# Patient Record
(demographics unavailable — no encounter records)

---

## 2024-11-12 NOTE — PHYSICAL EXAM
[Well Developed] : well developed [Well Nourished] : well nourished [No Acute Distress] : no acute distress [No Carotid Bruit] : no carotid bruit [Normal S1, S2] : normal S1, S2 [No Murmur] : no murmur [Clear Lung Fields] : clear lung fields [Good Air Entry] : good air entry [No Respiratory Distress] : no respiratory distress  [Edema ___] : edema [unfilled] [Venous varicosities] : venous varicosities [Moves all extremities] : moves all extremities [No Focal Deficits] : no focal deficits [Normal Speech] : normal speech [Alert and Oriented] : alert and oriented [Normal memory] : normal memory

## 2024-11-12 NOTE — REVIEW OF SYSTEMS
[Chest Discomfort] : chest discomfort [Lower Ext Edema] : lower extremity edema [Constipation] : constipation [Depression] : depression [Negative] : Heme/Lymph

## 2024-11-13 NOTE — HISTORY OF PRESENT ILLNESS
[FreeTextEntry1] : The patient's home blood pressures are okay.  He walks a little but climbs 5 flights of stairs.  He has had a left chest discomfort which is dull and rare.  He saw the gastroenterologist 4 months ago.  He is not on a low-salt diet.

## 2024-11-13 NOTE — DISCUSSION/SUMMARY
[FreeTextEntry1] : The patient is physically active without difficulty.  His chest pain is noncardiac.  The EKG shows low voltage.  He has edema.  He will return for an echocardiogram.  He will elevate his legs and reduce the salt in his diet.He will continue amlodipine.  He has a history of venous reflux.  He will return for venous Doppler. [EKG obtained to assist in diagnosis and management of assessed problem(s)] : EKG obtained to assist in diagnosis and management of assessed problem(s)

## 2024-12-26 NOTE — PHYSICAL EXAM
[General Appearance - Alert] : alert [General Appearance - In No Acute Distress] : in no acute distress [Neck Appearance] : the appearance of the neck was normal [Neck Cervical Mass (___cm)] : no neck mass was observed [Jugular Venous Distention Increased] : there was no jugular-venous distention [Thyroid Diffuse Enlargement] : the thyroid was not enlarged [Thyroid Nodule] : there were no palpable thyroid nodules [Auscultation Breath Sounds / Voice Sounds] : lungs were clear to auscultation bilaterally [Heart Rate And Rhythm] : heart rate was normal and rhythm regular [Heart Sounds] : normal S1 and S2 [Heart Sounds Gallop] : no gallops [Murmurs] : no murmurs [Heart Sounds Pericardial Friction Rub] : no pericardial rub [Bowel Sounds] : normal bowel sounds [Abdomen Soft] : soft [Abdomen Tenderness] : non-tender [] : no hepato-splenomegaly [Abdomen Mass (___ Cm)] : no abdominal mass palpated [Cervical Lymph Nodes Enlarged Posterior Bilaterally] : posterior cervical [Cervical Lymph Nodes Enlarged Anterior Bilaterally] : anterior cervical [Supraclavicular Lymph Nodes Enlarged Bilaterally] : supraclavicular [FreeTextEntry1] : no tenderness on right side

## 2024-12-26 NOTE — ASSESSMENT
[FreeTextEntry1] : -- I have advised them that I am transitioning away from being a PCP and they will find a new PCP. I will remain their PCP until they transition to a new PCP. I will remain their nephrologist. Information given and referral made.  # HTN controlled. White coat effect. Home machine was confirmed accurate today.  * Continue torsemide 2.5 mg three times a week. (This is the dose he has been taking.) * Cont torsemide, losartan, amlodipine. * A counseling information sheet on blood pressure and staying healthy has been given (which they have been instructed to read). * The patient has been counseled to check their BP at home with an automatic arm cuff, write down the readings, and reach me directly on the phone immediately if they are persistently > 180 systolic or if SBP is less than 100 or if lightheadedness develops. They were counseled to bring in all blood pressure readings and medications next visit. * The patient has been counseled that regular office follow-up (at least every 2 months for now) is important for monitoring and for their health, and that it is their responsibility to make follow up appointments. * The patient also has been counseled that they must never stop or change any medications without discussing this with me (or another physician).  # CKD stage 3 due to aging/HTN with 124 mg albuminuria. * Recheck labs next visit.  * Not on SGLT2i due to rash. * Therapies for kidney disease: blood pressure control; proteinuria reduction with ARB/ACEi; other evidence-based therapies recommended including exercise, a plant-based lower oxalate diet, and 400 mcg folic acid daily * Cardiovascular disease prevention: counseling on healthy diet, physical activity, weight loss, alcohol limitation, blood pressure control; statin therapy; cardiology evaluation/followup advised * A counseling information sheet on CKD has been given (which they have been instructed to read). * The patient has been counseled that chronic kidney disease is a significant condition and regular office follow-up with me (at least every 2 months for now) is important for monitoring and their health, and that it is their responsibility to make a follow-up appointment. * The patient has been counseled never to stop taking their medications without discussing it with me or another doctor. * The patient has been counseled on avoiding NSAIDs. * The patient has been counseled on risk of worsening kidney function and instructed to immediately call and speak with me and go immediately to ER with any severe symptoms, nausea, vomiting, diarrhea, chest pain, or shortness of breath.  # History of renal cell cancer. * Follow up with radiology/urology.  # Hypokalemia. * Improved on   # Urolithiasis. * High fluid intake.   # Monoclonal protein. * Follow up with heme.

## 2024-12-26 NOTE — HISTORY OF PRESENT ILLNESS
[FreeTextEntry1] : HTN borderline uncontrolled.  - 130s at home. White coat effect. No SOB with exertion. No CP. No lightheadedness (pre-syncope). Adherent to medications. * K normalized (4.2). CKD stable, creatinine 1.86, eGFR 35 by CKD-EPIcr. * He has seen hematologist for monocloanl protein (IgG). * No renal colic.   Previous history (13Sep24): * K 3.4. No history of hyperkalemia but he has been on strict low K diet. * eGFR 35 (avg. of CKD-EPIcr & CKD-EPIcys).   Previous history (09Sep24):  * Renal lesion being followed by urology and IR. * No abdominal discomfort.  * HTN uncontrolled. BP 110s - 120s at home. No SOB with exertion. No CP. No lightheadedness. Compliant with medications.* He has been taking torsemide 2.5 mg daily and wishes to stay on this dose.   Previous history (16Jul24):  * Renal lesion being followed by urology and IR. * Right sided discomfort on 4/8 and CT scan showed possible passed kidney stone on right that was in the bladder. Following up with urologist. No further colic. * HTN borderline uncontrolled. BP 120s - 130s at home. No SOB with exertion. No CP. No lightheadedness. Compliant with medications. Swelling was intolerable with amlodipine 10, which was reduced to 5. * No diuresis to torsemide 2.5.   Previous history (20May24): eGFR 38 (avg. of CKD-EPIcr & CKD-EPIcys). 207 mg albuminuria. * Renal lesion being followed by urology and IR. * BP controlled. BP 120s at home. No SOB with exertion. No CP. No lightheadedness. Compliant with medications. * Right sided discomfort on 4/8 and CT scan showed possible passed kidney stone on right that was in the bladder. Following up with urologist.   Previous history (09Mar24): BP controlled. BP 120s on average at home. White coat effect in office.No SOB with exertion. No CP. No lightheadedness. Compliant with medications.  * 400 mg proteinuria. eGFR 38 (avg. of CKD-EPIcr & CKD-EPIcys). * Right sided discomfort on 4/8 and CT scan showed possible passed kidney stone on right that was in the bladder. Following up with urologist.   Previous history (11Apr24): Following up with urologist. for stone. * Cr 1.77. * No flank pain now c/w kidney stone. * HTN controlled.   Previous history (09Apr24): Labs from 4/8/24 reviewed: creatinine1.77. BP controlled.  No SOB with exertion. No CP. No lightheadedness. Compliant with medications. * Right sided discomfort on 4/8 and CT scan showed possible passed kidney stone on right that was in the bladder.   Previous history (06Mar24): * CKD stable, creatinine 1.86. Feels well. * He is taking torsemide 0.5 3x a week. * S/P ablation of left renal mass. Being followed yearly. * BP controlled.  - 120s  at home.   Previous history ([id number]): * BP controlled.  - 120s  at home. White coat effect in office. No lightheadedness. No CP/SOB. Compliant with medications.  * He is taking torsemide 0.5 3x a week. * S/P ablation of left renal mass. Being followed yearly. * 160 mg albuminuria. CKD stable, creatinine 1.86.   Previous history (28Ulu24): Labs from Dr. Ye: Cr 1.86. 160 mg albuminuria. * BP controlled.  - 130s at home. White coat effect. No lightheadedness. No CP/SOB. Compliant with medications. * S/P ablation of left renal mass. Being followed yearly. * No further episodes of low temperatures. Baseline is 95 - 96. No chills. * He is taking torsemide 0.5 5x a week.   Previous history (53Hgi46): eGFR 37 (CKD-EPIcr & CKD-EPIcys avg). Urine albumin 58. * BP controlled.  - 120s at home. No lightheadedness. No CP/SOB. Compliant with medications. * S/P ablation of left renal mass. Being followed yearly. * 5x nocturia. Following up with urology. NO polyuria during the daytime.  * No further episodes of low temperatures. Baseline is 95 - 96. No chills.   Previous history ([id number]): * Went to ER for low temp of unclear cause. No signs of sepsis. Now normalized. * eGFR 34. 58 * HGA1c controlled. * S/P ablation of left renal mass. Being followed yearly. * 5x nocturia. Following up with urology. NO polyuria during the daytime.   Previous history (12May23): eGFR 41 based on cystatin C of 1.56 (EK eGFRcys equation, [facility] 2023). * HTN controlled. BP 110s at home. No lightheadedness. No CP/SOB. Compliant with medications.  * S/P ablation of left renal mass. 1.1 cm renal mass in right kidney being followed.   Previous history (11Apr23): eGFR 39 based on cystatin C of 1.65 (EK eGFRcys equation, [facility] 2023).* HTN controlled. BP 110s at home. No lightheadedness. No CP/SOB. Compliant with medications. * S/P ablation of 2.6 cm left renal mass. He is following up with radiologist fo surveillance.   Previous history (11Jan23): * CKD stable, creatinine 1.83 in October 2022. * HTN controlled. BP 110s at home. No lightheadedness. No CP/SOB. Compliant with medications. * S/P ablation of 2.6 cm left renal mass. He is following up with radiologist fo surveillance.   Previous history (09Nov22): * CKD stable, creatinine 1.83 on 10/22 with Dr. Ye. ** HTN controlled. BP 110s at home. No lightheadedness. No CP/SOB. Compliant with medications. * S/P ablation of 2.6 cm left renal mass. He is following up with radiologist fo surveillance.   Previous history (18Aug22): * CKD stable, eGFR 32.5. * HTN controlled.  - 130s at home. No lightheadedness. No CP/SOB. Compliant with medications. * He is following up with radiologist for renal cell cancer surveillance. (S/P ablation of 2.6 cm left kidney mass).   Previous history (02Aug22): * HTN controlled.  - 120s at home. No lightheadedness. No CP/SOB. Compliant with medications. * CKD stable, creatinine 1.86. * He is following up with radiologist for renal cell cancer surveillance. (S/P ablation of 2.6 cm left kidney mass).  Previous history (01Jun22): * Covid 1 month ago treated with renal-dose Paxlovid. No symptoms currently. * * HTN controlled.  - 120s at home. No lightheadedness. No CP/SOB. Compliant with medications. * CKD stable, creatinine 1.86.  * He is following up with radiologist for renal cell cancer surveillance. (S/P ablation of 2.6 cm left kidney mass).  Previous history (18Mar22): * HTN controlled.  - 120s at home. No lightheadedness. Compliant with medications. * He is following up with radiologist for renal cell cancer surveillance. (S/P ablation of 2.6 cm left kidney mass). * CKD stable, creatinine 2.02.   Previous history (81Ote10): He is following up with radiologist for renal cell cancer surveillance. (S/P ablation of 2.6 cm left kidney mass).  He  * HTN controlled. No lightheadedness.  - 120s at home. Compliant with medications. * Labs from Dr. Ye reviewed. 11/15: Creatinine 2.05.   Previous history (36Ixo64): * CKD stable, creatinine 1.94 on 8/31. *  HTN controlled.  - 120s at home. No lightheadedness. Compliant with medications. * He is following up with radiologist for renal cell cancer surveillance. (S/P ablation of 2.6 cm left kidney mass).  Previous history (19Ycz60): * CKD stable, creatinine 2.18. * * HTN controlled. No lightheadedness. Compliant with medications.  * He is following up with radiologist for renal cell cancer surveillance. (S/P ablation of 2.6 cm left kidney mass).  Previous history (21Hai75): * HTN controlled.  - 120s at home. No lightheadedness. Compliant with medications. * CKD stable.  * He is following up with radiologist for renal cell cancer surveillance. (S/P ablation of 2.6 cm left kidney mass).  Previous history (09Jun21): * HTN uncontrolled.  - 150s at home. No lightheadedness. Compliant with medications. * Stopped farxiga due to arm erythema, which has now resolved. * He is following up with radiologist for renal cell cancer surveillance. (S/P ablation of 2.6 cm left kidney mass). * CKD stable, creatinine 1.91.   Previous history (17May21): * CKD stable, creatinine 1.9. * He is following up with radiologist for renal cell cancer surveillance. (S/P ablation of 2.6 cm left kidney mass). * HTN uncontrolled at home, 120s - 150s. Unable to tolerate clonidine.  * Started farxiga once and developed bilateral arm redness without other pain. This has now resolving and he is considering starting again when it completely resolves.   Previous history (06May21): * CKD stable, creatinine 1.9 (eGFR 32). 109 mg albuminuria. * He is following up with radiologist for renal cell cancer surveillance. (S/P ablation of 2.6 cm left kidney mass). * HTN uncontrolled at home, 120s - 150s. Unable to tolerate clonidine.   Previous history (29Mar21): * CKD stable, creatinine 1.9.  * He is following up with radiologist for renal cell cancer surveillance. (S/P ablation of 2.6 cm left kidney mass). * HTN previously borderline controlled.  with Dr. Huang. No lightheadedness. BP 110s - 130 at home.   Previous history (20Jan21): * Moderna vaccine given yesterday. * CKD stable, creatinine 1.94 (eGFR 31). * He is following up with radiologist for renal cell cancer surveillance. (S/P ablation of 2.6 cm left kidney mass).  * HTN controlled. No lightheadedness.   Previous history (28Dec20): * CKD progressive, creatinine 1.94 (eGFR 31). * He is following up with radiologist for renal cell cancer surveillance. (S/P ablation of 2.6 cm left kidney mass). * 200 - 300 mg proteinuria. * Losartan decreased to 50 and amlodipine increased to 10 by Dr. Huang [id number]. BP 110s/70s. No lightheadedness.  * Diplopia due to NPH improving, S/P  shunt. * Anemia improved.   Previous history (21Oct20): * Diplopia due to NPH improving, S/P  shunt. * CKD progressive, creatinine 1.9 eGFR 34. *  PTH 75, Ca 9.3. * HTN controlled, 110 - 120s/70s. No lightheadedness. * He is following up with radiologist for renal cell cancer surveillance. (S/P ablation of 2.6 cm left kidney mass).  Previous history (17Aug20): * 75 ounces of urine daily.  617 osm urine. Urine decreases when he drinks less. * CKD stable, creatinine 1.52, eGFR 33 by cystatin C. * HTN now controlled, average  / 70s. Still on amlodipine 5 mg qd. Now off dexamethasone.   Previous history (07Aug20): *  shunt placed for NPH. Now has double vision. Started on dexamethasone 5 days ago. * Creatinine increased to 1.86 (eGFR 33) on 10Hlb18. U/A normal. * He is following up with radiologist for renal cell cancer surveillance. (S/P ablation of 2.6 cm left kidney mass). * SBP now 140s. He had previously been on amlodipine 7.5 and is now on amlodipine 5. BP at home 120s. * HE WAKES UP 4 - 6 TIMES A NIGHT AND URINATES 2 LITERS. He is following with Cristian Sarmiento (urologist). PVR 82. SG 1.014. SNa 142. Reportedly he urinates < 4 times daily. Denies increased thirst.   Previous history (26May20): * CKD stable, creatinine previously 1.49. He has not had labs in 5 months. * Diagnosed with PD. Following up with Dr. Nino. * MRI scheduled for renal cell cancer surveillance. *   Previous history (13Apr20): * Average BP at home 120s - 130s. No lightheadedness. Compliant. * CKD stable, creatinine 1.46. * No muscle aching or weakness on statin. * He is following up with radiologist for renal cell cancer surveillance.   Previous history (01Oct19): * 1 - 2 weeks ago he developed URI with slight diarrhea, which is now resolving. * CKD previously stable, creatinine 1.88. * HTN controlled. No lightheadedness. Compliant with medications.   Previous history (17Sep19): * Creatinine increased to 1.88 in [id number] and decreased to 1.66. * HTN borderline controlled. No lightheadedness. Compliant with medications. * He has been evaluated by Dr. Meek for monoclonal gammopathy.   Previous history (16Aug19): * Following up with Dr. Kwan s/p ablation of left renal cell carcinoma. * CKD previous stable, creatinine 1.59 in 6/27/19.. * HTN controlled. No lightheadedness. Compliant with medications. * he has been evaluated by Dr. Meek for monoclonal gammopathy.  Previous history (16May19): * Underwent ablation of left renal cell carcinoma. * CKD stable, creatinine 1.52. * HTN controlled.  - 130s at home.  No lightheadedness. Compliant with medications.    Previous history (03Apr19): * Scheduled for ablation for left renal cell carcinoma (2.6 cm). * CKD stable, creatinine 1.42. * HTN uncontrolled in office, SBP 120s - 130s average at home.  SBP with Dr. Huang.  No lightheadedness. Compliant with medications. Following low salt diet. * He believes amlodipine causing constipation and LE edema and would like to stop this.   Previous history (14Feb19): * Since 2014, his creatinine has ranged from 1.4 - 1.7. Most recently, his creatinine was 1.48 (GFR 44). He is unaware of proteinuria or hematuria. The patient denies exposure to chronic NSAIDs, PPIs, green smoothies, creatine, or herbal supplements. The patient denies a history of kidney stones or UTIs.  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 No evidence of myeloma.    Previous chart reviewed and summarized.   Options for clinical preventative services  Moderna 20Jan21, Booster;  Bivalent 2022  Influenza: September 2020, sep 2022  Pneumonia: given x 2 Shingles: shingrix  TDAP: Colonoscopy: 2018 PSA: by urology Dermatologist: advised to see yearly

## 2025-01-09 NOTE — ASSESSMENT
[FreeTextEntry1] : 87 year old gentleman with PMH of hypothyroidism, chronic anemia, CKD stage III, hx of RCC, HTN, HLD, depression, glaucoma, possible nephrolithiasis, who presents for 1.5 right renal mass  1. 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  2. Nocturia  - voiding dairy (didn't do it again)  - consider MAUREEN as cause  Thank you very much for allowing me to assist in the care of this patient. Please do not hesitate to contact me with any additional questions or concerns.  Sincerely,

## 2025-01-09 NOTE — HISTORY OF PRESENT ILLNESS
[FreeTextEntry1] : Dear Dr. Guzman (nephrologist), Jl Sarmiento (Urologist)  Thank you so much for the referral to help care for your patient.  Chief Complaint: 1.5cm right renal mass (stable) Date of first visit: 7/17/24  Barry P. Dubin is a 87 year old gentleman with PMH of hypothyroidism, chronic anemia, CKD stage III, hx of RCC, HTN, HLD, depression, glaucoma, possible nephrolithiasis, who presents for 1.5cm right renal mass. Brother with Kidney Cancer. the interval imaging May 2024 to August 2024 (stable).  Previously patient of Dr. Kwan, renal US ordered by nephrology for CKD showed 2.6cm left renal mass (2019), s/p image guided microwave ablation by Dr. Brandyn Keating on 4/15/19. 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  Allergies: Farxiga and sertraline  PSA Hx: none   MRI Hx: MRI Abdomen at [facility] on 01/07/2025 -  1.  Indeterminate, internal signal changes of the post ablation cavity left upper lobe pole. Cannot totally exclude a low level of enhancement along the inferior aspect of the lesion. Recommend continued follow-up 2.  Stable 1.5 cm mass upper pole right kidney, indeterminate. Recommend continued follow-up.  MRI August 1st 2024. The renal mass is stable over 3 month interval.  MRI Abdomen at [facility] on 05/02/2024. -  1. Since 4/8/2024, no significant change in appearance of ablation cavity along medial aspect of upper pole of left kidney. No evidence of recurrent cancer at this site.  2. A 1.5 cm mass arising from the upper pole of the right kidney is suspicious for an additional site of papillary renal cell carcinoma.  3. There are again a few hemorrhagic cysts in each kidney in additional multiple simple cysts.  4. Small ascites.  5. No change small right adrenal nodule, probably a lipid poor adenoma.  The patient denies fevers, chills, nausea and or vomiting and no unexplained weight loss.  All pertinent laboratory, films and physician notes were reviewed. Questionnaire results were discussed with patient.

## 2025-01-15 NOTE — ASSESSMENT
[FreeTextEntry1] : 87-year-old gentleman with PMH of hypothyroidism, chronic anemia, CKD stage III, hx of RCC, HTN, HLD, depression, glaucoma, possible nephrolithiasis, who presents for follow up for 1.5 right renal mass  1. 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 - recent MRi Cannot totally exclude a low level of enhancement along the inferior aspect of the post ablation cavity in left upper lobe pole kidney, we will repeat MRI in 3 months   2. Nocturia  - voiding dairy (didn't do it again)  - consider MAUREEN as cause- per patient had MAUREEN ruled out by pulmonologist in past   Thank you very much for allowing me to assist in the care of this patient. Please do not hesitate to contact me with any additional questions or concerns.     Sincerely,     George Wiley D.O. Professor of Urology and Radiology  of Urology at [facility] Director for Prostate Cancer [address] [place] [address], [address] [place] [address] Phone: [phone]

## 2025-01-15 NOTE — ASSESSMENT
[FreeTextEntry1] : 87-year-old gentleman with PMH of hypothyroidism, chronic anemia, CKD stage III, hx of RCC, HTN, HLD, depression, glaucoma, possible nephrolithiasis, who presents for follow up for 1.5 right renal mass  1. 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 - recent MRi Cannot totally exclude a low level of enhancement along the inferior aspect of the post ablation cavity in left upper lobe pole kidney, we will repeat MRI in 3 months   2. Nocturia  - voiding dairy (didn't do it again)  - consider MAUREEN as cause- per patient had MAUREEN ruled out by pulmonologist in past   Thank you very much for allowing me to assist in the care of this patient. Please do not hesitate to contact me with any additional questions or concerns.     Sincerely,     George Wiley D.O. Professor of Urology and Radiology  of Urology at [facility] Director for Prostate Cancer [address] [place] [address], [address] [place] [address] Phone: [phone]

## 2025-01-15 NOTE — ASSESSMENT
[FreeTextEntry1] : 87-year-old gentleman with PMH of hypothyroidism, chronic anemia, CKD stage III, hx of RCC, HTN, HLD, depression, glaucoma, possible nephrolithiasis, who presents for follow up for 1.5 right renal mass  1. 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 - recent MRi Cannot totally exclude a low level of enhancement along the inferior aspect of the post ablation cavity in left upper lobe pole kidney, we will repeat MRI in 3 months   2. Nocturia  - voiding dairy (didn't do it again)  - consider MAUREEN as cause- per patient had MAUREEN ruled out by pulmonologist in past   Thank you very much for allowing me to assist in the care of this patient. Please do not hesitate to contact me with any additional questions or concerns.     Sincerely,     George Wiley D.O. Professor of Urology and Radiology  of Urology at [facility] Director for Prostate Cancer [address] [place] [address], [address] [place] [address] Phone: [phone]

## 2025-01-15 NOTE — HISTORY OF PRESENT ILLNESS
[FreeTextEntry1] : Dear Dr. Guzman (nephrologist),   Thank you so much for the referral to help care for your patient.  Chief Complaint: 1.5cm right renal mass (stable) Date of first visit: 7/17/24  Barry P. Dubin is a 87-year-old gentleman with PMH of hypothyroidism, chronic anemia, CKD stage III, hx of RCC, HTN, HLD, depression, glaucoma, possible nephrolithiasis, who presents for 1.5cm right renal mass. Brother with Kidney Cancer. the interval imaging May 2024 to August 2024 (stable).  Previously patient of Dr. Kwan, renal US ordered by nephrology for CKD showed 2.6cm left renal mass (2019), s/p image guided microwave ablation by Dr. Brandyn Keating on 4/15/19. 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  Allergies: Farxiga and sertraline  1/15/25  IPSS10 QOL  3 VALERIE 13  max flow 5.4 ml/s, ave 3.0 ml/s, VV 20.9cc nondiagnostic, PVR 14cc   PSA Hx: none   MRI Hx: MRI Abdomen at [facility] on 01/07/2025 -  1.  Indeterminate, internal signal changes of the post ablation cavity left upper lobe pole. Cannot totally exclude a low level of enhancement along the inferior aspect of the lesion. Recommend continued follow-up 2.  Stable 1.5 cm mass upper pole right kidney, indeterminate. Recommend continued follow-up.  MRI August 1st 2024. The renal mass is stable over 3 month interval.  MRI Abdomen at [facility] on 05/02/2024. -  1. Since 4/8/2024, no significant change in appearance of ablation cavity along medial aspect of upper pole of left kidney. No evidence of recurrent cancer at this site.  2. A 1.5 cm mass arising from the upper pole of the right kidney is suspicious for an additional site of papillary renal cell carcinoma.  3. There are again a few hemorrhagic cysts in each kidney in additional multiple simple cysts.  4. Small ascites.  5. No change small right adrenal nodule, probably a lipid poor adenoma.  The patient denies fevers, chills, nausea and or vomiting and no unexplained weight loss.  All pertinent laboratory, films and physician notes were reviewed. Questionnaire results were discussed with patient.

## 2025-01-15 NOTE — HISTORY OF PRESENT ILLNESS
[FreeTextEntry1] : Dear Dr. Guzman (nephrologist),   Thank you so much for the referral to help care for your patient.  Chief Complaint: 1.5cm right renal mass (stable) Date of first visit: 7/17/24  Barry P. Dubin is a 87-year-old gentleman with PMH of hypothyroidism, chronic anemia, CKD stage III, hx of RCC, HTN, HLD, depression, glaucoma, possible nephrolithiasis, who presents for 1.5cm right renal mass. Brother with Kidney Cancer. the interval imaging May 2024 to August 2024 (stable).  Previously patient of Dr. Kwan, renal US ordered by nephrology for CKD showed 2.6cm left renal mass (2019), s/p image guided microwave ablation by Dr. Brandyn Keating on 4/15/19. 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  Allergies: Farxiga and sertraline  1/15/25  IPSS10 QOL  3 VALERIE 13  max flow 5.4 ml/s, ave 3.0 ml/s, VV 20.9cc nondiagnostic, PVR 14cc   PSA Hx: none   MRI Hx: MRI Abdomen at [facility] on 01/07/2025 -  1.  Indeterminate, internal signal changes of the post ablation cavity left upper lobe pole. Cannot totally exclude a low level of enhancement along the inferior aspect of the lesion. Recommend continued follow-up 2.  Stable 1.5 cm mass upper pole right kidney, indeterminate. Recommend continued follow-up.  MRI August 1st 2024. The renal mass is stable over 3 month interval.  MRI Abdomen at [facility] on 05/02/2024. -  1. Since 4/8/2024, no significant change in appearance of ablation cavity along medial aspect of upper pole of left kidney. No evidence of recurrent cancer at this site.  2. A 1.5 cm mass arising from the upper pole of the right kidney is suspicious for an additional site of papillary renal cell carcinoma.  3. There are again a few hemorrhagic cysts in each kidney in additional multiple simple cysts.  4. Small ascites.  5. No change small right adrenal nodule, probably a lipid poor adenoma.  The patient denies fevers, chills, nausea and or vomiting and no unexplained weight loss.  All pertinent laboratory, films and physician notes were reviewed. Questionnaire results were discussed with patient.

## 2025-01-15 NOTE — ADDENDUM
[FreeTextEntry1] :   I, Dr. Wiley, personally performed the evaluation and management (E/M) services for this established patient who presents today with (a) new problem(s)/exacerbation of (an) existing condition(s).  That E/M includes conducting the examination, assessing all new/exacerbated conditions, and establishing a new plan of care.  Today, my ACP, Bibiana Manning, ANP-BC, was here to observe my evaluation and management services for this new problem/exacerbated condition to be followed going forward.

## 2025-02-26 NOTE — HISTORY OF PRESENT ILLNESS
[FreeTextEntry1] : * eGFR 35 (avg. of CKD-EPIcr & CKD-EPIcys). 207 mg albuminuria. * He has seen hematologist for monocloanl protein (IgG). * No renal colic. * BP controlled.  - 130s at home. White coat effect in office. No SOB with exertion. No CP. No lightheadedness (pre-syncope). Adherent to medications.   Previous history ([id number]): HTN borderline uncontrolled.  - 130s at home. White coat effect. No SOB with exertion. No CP. No lightheadedness (pre-syncope). Adherent to medications. * K normalized (4.2). CKD stable, creatinine 1.86, eGFR 35 by CKD-EPIcr. * He has seen hematologist for monocloanl protein (IgG). * No renal colic.   Previous history (13Sep24): * K 3.4. No history of hyperkalemia but he has been on strict low K diet. * eGFR 35 (avg. of CKD-EPIcr & CKD-EPIcys).   Previous history (09Sep24):  * Renal lesion being followed by urology and IR. * No abdominal discomfort.  * HTN uncontrolled. BP 110s - 120s at home. No SOB with exertion. No CP. No lightheadedness. Compliant with medications.* He has been taking torsemide 2.5 mg daily and wishes to stay on this dose.   Previous history ([id number]):  * Renal lesion being followed by urology and IR. * Right sided discomfort on 4/8 and CT scan showed possible passed kidney stone on right that was in the bladder. Following up with urologist. No further colic. * HTN borderline uncontrolled. BP 120s - 130s at home. No SOB with exertion. No CP. No lightheadedness. Compliant with medications. Swelling was intolerable with amlodipine 10, which was reduced to 5. * No diuresis to torsemide 2.5.   Previous history (20May24): eGFR 38 (avg. of CKD-EPIcr & CKD-EPIcys). 207 mg albuminuria. * Renal lesion being followed by urology and IR. * BP controlled. BP 120s at home. No SOB with exertion. No CP. No lightheadedness. Compliant with medications. * Right sided discomfort on 4/8 and CT scan showed possible passed kidney stone on right that was in the bladder. Following up with urologist.   Previous history (09Mar24): BP controlled. BP 120s on average at home. White coat effect in office.No SOB with exertion. No CP. No lightheadedness. Compliant with medications.  * 400 mg proteinuria. eGFR 38 (avg. of CKD-EPIcr & CKD-EPIcys). * Right sided discomfort on 4/8 and CT scan showed possible passed kidney stone on right that was in the bladder. Following up with urologist.   Previous history (11Apr24): Following up with urologist. for stone. * Cr 1.77. * No flank pain now c/w kidney stone. * HTN controlled.   Previous history (09Apr24): Labs from 4/8/24 reviewed: creatinine1.77. BP controlled.  No SOB with exertion. No CP. No lightheadedness. Compliant with medications. * Right sided discomfort on 4/8 and CT scan showed possible passed kidney stone on right that was in the bladder.   Previous history (06Mar24): * CKD stable, creatinine 1.86. Feels well. * He is taking torsemide 0.5 3x a week. * S/P ablation of left renal mass. Being followed yearly. * BP controlled.  - 120s  at home.   Previous history (26Feb24): * BP controlled.  - 120s  at home. White coat effect in office. No lightheadedness. No CP/SOB. Compliant with medications.  * He is taking torsemide 0.5 3x a week. * S/P ablation of left renal mass. Being followed yearly. * 160 mg albuminuria. CKD stable, creatinine 1.86.   Previous history (08Dec23): Labs from Dr. Ye: Cr 1.86. 160 mg albuminuria. * BP controlled.  - 130s at home. White coat effect. No lightheadedness. No CP/SOB. Compliant with medications. * S/P ablation of left renal mass. Being followed yearly. * No further episodes of low temperatures. Baseline is 95 - 96. No chills. * He is taking torsemide 0.5 5x a week.   Previous history (18Sep23): eGFR 37 (CKD-EPIcr & CKD-EPIcys avg). Urine albumin 58. * BP controlled.  - 120s at home. No lightheadedness. No CP/SOB. Compliant with medications. * S/P ablation of left renal mass. Being followed yearly. * 5x nocturia. Following up with urology. NO polyuria during the daytime.  * No further episodes of low temperatures. Baseline is 95 - 96. No chills.   Previous history (17Jul23): * Went to ER for low temp of unclear cause. No signs of sepsis. Now normalized. * eGFR 34. 58 * HGA1c controlled. * S/P ablation of left renal mass. Being followed yearly. * 5x nocturia. Following up with urology. NO polyuria during the daytime.   Previous history (12May23): eGFR 41 based on cystatin C of 1.56 (EK eGFRcys equation, [facility] 2023). * HTN controlled. BP 110s at home. No lightheadedness. No CP/SOB. Compliant with medications.  * S/P ablation of left renal mass. 1.1 cm renal mass in right kidney being followed.   Previous history (11Apr23): eGFR 39 based on cystatin C of 1.65 (EK eGFRcys equation, [facility] 2023).* HTN controlled. BP 110s at home. No lightheadedness. No CP/SOB. Compliant with medications. * S/P ablation of 2.6 cm left renal mass. He is following up with radiologist fo surveillance.   Previous history (11Jan23): * CKD stable, creatinine 1.83 in October 2022. * HTN controlled. BP 110s at home. No lightheadedness. No CP/SOB. Compliant with medications. * S/P ablation of 2.6 cm left renal mass. He is following up with radiologist fo surveillance.   Previous history (09Nov22): * CKD stable, creatinine 1.83 on 10/22 with Dr. Ye. ** HTN controlled. BP 110s at home. No lightheadedness. No CP/SOB. Compliant with medications. * S/P ablation of 2.6 cm left renal mass. He is following up with radiologist fo surveillance.   Previous history (18Aug22): * CKD stable, eGFR 32.5. * HTN controlled.  - 130s at home. No lightheadedness. No CP/SOB. Compliant with medications. * He is following up with radiologist for renal cell cancer surveillance. (S/P ablation of 2.6 cm left kidney mass).   Previous history (41Zue40): * HTN controlled.  - 120s at home. No lightheadedness. No CP/SOB. Compliant with medications. * CKD stable, creatinine 1.86. * He is following up with radiologist for renal cell cancer surveillance. (S/P ablation of 2.6 cm left kidney mass).  Previous history (01Jun22): * Covid 1 month ago treated with renal-dose Paxlovid. No symptoms currently. * * HTN controlled.  - 120s at home. No lightheadedness. No CP/SOB. Compliant with medications. * CKD stable, creatinine 1.86.  * He is following up with radiologist for renal cell cancer surveillance. (S/P ablation of 2.6 cm left kidney mass).  Previous history (18Mar22): * HTN controlled.  - 120s at home. No lightheadedness. Compliant with medications. * He is following up with radiologist for renal cell cancer surveillance. (S/P ablation of 2.6 cm left kidney mass). * CKD stable, creatinine 2.02.   Previous history (20Wke13): He is following up with radiologist for renal cell cancer surveillance. (S/P ablation of 2.6 cm left kidney mass).  He  * HTN controlled. No lightheadedness.  - 120s at home. Compliant with medications. * Labs from Dr. Ye reviewed. 11/15: Creatinine 2.05.   Previous history (37Qbw57): * CKD stable, creatinine 1.94 on 8/31. *  HTN controlled.  - 120s at home. No lightheadedness. Compliant with medications. * He is following up with radiologist for renal cell cancer surveillance. (S/P ablation of 2.6 cm left kidney mass).  Previous history (53Tao99): * CKD stable, creatinine 2.18. * * HTN controlled. No lightheadedness. Compliant with medications.  * He is following up with radiologist for renal cell cancer surveillance. (S/P ablation of 2.6 cm left kidney mass).  Previous history (52Ped29): * HTN controlled.  - 120s at home. No lightheadedness. Compliant with medications. * CKD stable.  * He is following up with radiologist for renal cell cancer surveillance. (S/P ablation of 2.6 cm left kidney mass).  Previous history (09Jun21): * HTN uncontrolled.  - 150s at home. No lightheadedness. Compliant with medications. * Stopped farxiga due to arm erythema, which has now resolved. * He is following up with radiologist for renal cell cancer surveillance. (S/P ablation of 2.6 cm left kidney mass). * CKD stable, creatinine 1.91.   Previous history (17May21): * CKD stable, creatinine 1.9. * He is following up with radiologist for renal cell cancer surveillance. (S/P ablation of 2.6 cm left kidney mass). * HTN uncontrolled at home, 120s - 150s. Unable to tolerate clonidine.  * Started farxiga once and developed bilateral arm redness without other pain. This has now resolving and he is considering starting again when it completely resolves.   Previous history (06May21): * CKD stable, creatinine 1.9 (eGFR 32). 109 mg albuminuria. * He is following up with radiologist for renal cell cancer surveillance. (S/P ablation of 2.6 cm left kidney mass). * HTN uncontrolled at home, 120s - 150s. Unable to tolerate clonidine.   Previous history (29Mar21): * CKD stable, creatinine 1.9.  * He is following up with radiologist for renal cell cancer surveillance. (S/P ablation of 2.6 cm left kidney mass). * HTN previously borderline controlled.  with Dr. Huang. No lightheadedness. BP 110s - 130 at home.   Previous history (20Jan21): * Moderna vaccine given yesterday. * CKD stable, creatinine 1.94 (eGFR 31). * He is following up with radiologist for renal cell cancer surveillance. (S/P ablation of 2.6 cm left kidney mass).  * HTN controlled. No lightheadedness.   Previous history (28Dec20): * CKD progressive, creatinine 1.94 (eGFR 31). * He is following up with radiologist for renal cell cancer surveillance. (S/P ablation of 2.6 cm left kidney mass). * 200 - 300 mg proteinuria. * Losartan decreased to 50 and amlodipine increased to 10 by Dr. Huang [id number]. BP 110s/70s. No lightheadedness.  * Diplopia due to NPH improving, S/P  shunt. * Anemia improved.   Previous history (21Oct20): * Diplopia due to NPH improving, S/P  shunt. * CKD progressive, creatinine 1.9 eGFR 34. *  PTH 75, Ca 9.3. * HTN controlled, 110 - 120s/70s. No lightheadedness. * He is following up with radiologist for renal cell cancer surveillance. (S/P ablation of 2.6 cm left kidney mass).  Previous history (17Aug20): * 75 ounces of urine daily.  617 osm urine. Urine decreases when he drinks less. * CKD stable, creatinine 1.52, eGFR 33 by cystatin C. * HTN now controlled, average  / 70s. Still on amlodipine 5 mg qd. Now off dexamethasone.   Previous history (07Aug20): *  shunt placed for NPH. Now has double vision. Started on dexamethasone 5 days ago. * Creatinine increased to 1.86 (eGFR 33) on [id number]. U/A normal. * He is following up with radiologist for renal cell cancer surveillance. (S/P ablation of 2.6 cm left kidney mass). * SBP now 140s. He had previously been on amlodipine 7.5 and is now on amlodipine 5. BP at home 120s. * HE WAKES UP 4 - 6 TIMES A NIGHT AND URINATES 2 LITERS. He is following with Cristian Sarmiento (urologist). PVR 82. SG 1.014. SNa 142. Reportedly he urinates < 4 times daily. Denies increased thirst.   Previous history (26May20): * CKD stable, creatinine previously 1.49. He has not had labs in 5 months. * Diagnosed with PD. Following up with Dr. Nino. * MRI scheduled for renal cell cancer surveillance. *   Previous history (13Apr20): * Average BP at home 120s - 130s. No lightheadedness. Compliant. * CKD stable, creatinine 1.46. * No muscle aching or weakness on statin. * He is following up with radiologist for renal cell cancer surveillance.   Previous history (01Oct19): * 1 - 2 weeks ago he developed URI with slight diarrhea, which is now resolving. * CKD previously stable, creatinine 1.88. * HTN controlled. No lightheadedness. Compliant with medications.   Previous history (17Sep19): * Creatinine increased to 1.88 in [id number] and decreased to 1.66. * HTN borderline controlled. No lightheadedness. Compliant with medications. * He has been evaluated by Dr. Meek for monoclonal gammopathy.   Previous history (03Fqf80): * Following up with Dr. Kwan s/p ablation of left renal cell carcinoma. * CKD previous stable, creatinine 1.59 in 6/27/19.. * HTN controlled. No lightheadedness. Compliant with medications. * he has been evaluated by Dr. Meek for monoclonal gammopathy.  Previous history (24Mas04): * Underwent ablation of left renal cell carcinoma. * CKD stable, creatinine 1.52. * HTN controlled.  - 130s at home.  No lightheadedness. Compliant with medications.    Previous history (03Apr19): * Scheduled for ablation for left renal cell carcinoma (2.6 cm). * CKD stable, creatinine 1.42. * HTN uncontrolled in office, SBP 120s - 130s average at home.  SBP with Dr. Huang.  No lightheadedness. Compliant with medications. Following low salt diet. * He believes amlodipine causing constipation and LE edema and would like to stop this.   Previous history (14Feb19): * Since 2014, his creatinine has ranged from 1.4 - 1.7. Most recently, his creatinine was 1.48 (GFR 44). He is unaware of proteinuria or hematuria. The patient denies exposure to chronic NSAIDs, PPIs, green smoothies, creatine, or herbal supplements. The patient denies a history of kidney stones or UTIs.  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 No evidence of myeloma.    Previous chart reviewed and summarized.   Options for clinical preventative services  Moderna 20Jan21, Booster;  Bivalent 2022  Influenza: September 2020, sep 2022  Pneumonia: given x 2 Shingles: shingrix  TDAP: Colonoscopy: 2018 PSA: by urology Dermatologist: advised to see yearly

## 2025-02-26 NOTE — ASSESSMENT
[FreeTextEntry1] : -- I have advised them that I am transitioning away from being a PCP and they will find a new PCP. I will remain their PCP until they transition to a new PCP. I will remain their nephrologist. Information given and referral made.  # HTN controlled. White coat effect. * Continue torsemide 2.5 mg three times a week. (This is the dose he has been taking.) * Cont torsemide, losartan, amlodipine. * A counseling information sheet on blood pressure and staying healthy has been given (which they have been instructed to read). * The patient has been counseled to check their BP at home with an automatic arm cuff, write down the readings, and reach me directly on the phone immediately if they are persistently > 180 systolic or if SBP is less than 100 or if lightheadedness develops. They were counseled to bring in all blood pressure readings and medications next visit. * The patient has been counseled that regular office follow-up (at least every 2 months for now) is important for monitoring and for their health, and that it is their responsibility to make follow up appointments. * The patient also has been counseled that they must never stop or change any medications without discussing this with me (or another physician).  # CKD stage 3 due to aging/HTN with 124 mg albuminuria. * Recheck labs. * Not on SGLT2i due to rash. * Therapies for kidney disease: blood pressure control; proteinuria reduction with ARB/ACEi; other evidence-based therapies recommended including exercise, a plant-based lower oxalate diet, and 400 mcg folic acid daily * Cardiovascular disease prevention: counseling on healthy diet, physical activity, weight loss, alcohol limitation, blood pressure control; statin therapy; cardiology evaluation/followup advised * A counseling information sheet on CKD has been given (which they have been instructed to read). * The patient has been counseled that chronic kidney disease is a significant condition and regular office follow-up with me (at least every 2 months for now) is important for monitoring and their health, and that it is their responsibility to make a follow-up appointment. * The patient has been counseled never to stop taking their medications without discussing it with me or another doctor. * The patient has been counseled on avoiding NSAIDs. * The patient has been counseled on risk of worsening kidney function and instructed to immediately call and speak with me and go immediately to ER with any severe symptoms, nausea, vomiting, diarrhea, chest pain, or shortness of breath.  # History of renal cell cancer. * Follow up with radiology/urology.  # Hypokalemia. * Improved on losartan. * Check renin.   # Urolithiasis. * High fluid intake.   # Monoclonal protein. * Follow up with heme.

## 2025-03-03 NOTE — HISTORY OF PRESENT ILLNESS
[Telephone (audio)] : This telephonic visit was provided via audio only technology. [Verbal consent obtained from patient] : the patient, [unfilled] [FreeTextEntry1] : eGFR 30 (avg. of CKD-EPIcr & CKD-EPIcys). *  He has seen hematologist for monocloanl protein (IgG). * No renal colic. * BP controlled.  - 130s at home. White coat effect in office. No SOB with exertion. No CP. No lightheadedness (pre-syncope). Adherent to medications.  *   Previous history ([id number]): * eGFR 35 (avg. of CKD-EPIcr & CKD-EPIcys). 207 mg albuminuria. * He has seen hematologist for monocloanl protein (IgG). * No renal colic. * BP controlled.  - 130s at home. White coat effect in office. No SOB with exertion. No CP. No lightheadedness (pre-syncope). Adherent to medications.   Previous history (90Vju99): HTN borderline uncontrolled.  - 130s at home. White coat effect. No SOB with exertion. No CP. No lightheadedness (pre-syncope). Adherent to medications. * K normalized (4.2). CKD stable, creatinine 1.86, eGFR 35 by CKD-EPIcr. * He has seen hematologist for monocloanl protein (IgG). * No renal colic.   Previous history (13Sep24): * K 3.4. No history of hyperkalemia but he has been on strict low K diet. * eGFR 35 (avg. of CKD-EPIcr & CKD-EPIcys).   Previous history (09Sep24):  * Renal lesion being followed by urology and IR. * No abdominal discomfort.  * HTN uncontrolled. BP 110s - 120s at home. No SOB with exertion. No CP. No lightheadedness. Compliant with medications.* He has been taking torsemide 2.5 mg daily and wishes to stay on this dose.   Previous history ([id number]):  * Renal lesion being followed by urology and IR. * Right sided discomfort on 4/8 and CT scan showed possible passed kidney stone on right that was in the bladder. Following up with urologist. No further colic. * HTN borderline uncontrolled. BP 120s - 130s at home. No SOB with exertion. No CP. No lightheadedness. Compliant with medications. Swelling was intolerable with amlodipine 10, which was reduced to 5. * No diuresis to torsemide 2.5.   Previous history (20May24): eGFR 38 (avg. of CKD-EPIcr & CKD-EPIcys). 207 mg albuminuria. * Renal lesion being followed by urology and IR. * BP controlled. BP 120s at home. No SOB with exertion. No CP. No lightheadedness. Compliant with medications. * Right sided discomfort on 4/8 and CT scan showed possible passed kidney stone on right that was in the bladder. Following up with urologist.   Previous history (09Mar24): BP controlled. BP 120s on average at home. White coat effect in office.No SOB with exertion. No CP. No lightheadedness. Compliant with medications.  * 400 mg proteinuria. eGFR 38 (avg. of CKD-EPIcr & CKD-EPIcys). * Right sided discomfort on 4/8 and CT scan showed possible passed kidney stone on right that was in the bladder. Following up with urologist.   Previous history (11Apr24): Following up with urologist. for stone. * Cr 1.77. * No flank pain now c/w kidney stone. * HTN controlled.   Previous history (09Apr24): Labs from 4/8/24 reviewed: creatinine1.77. BP controlled.  No SOB with exertion. No CP. No lightheadedness. Compliant with medications. * Right sided discomfort on 4/8 and CT scan showed possible passed kidney stone on right that was in the bladder.   Previous history (06Mar24): * CKD stable, creatinine 1.86. Feels well. * He is taking torsemide 0.5 3x a week. * S/P ablation of left renal mass. Being followed yearly. * BP controlled.  - 120s  at home.   Previous history (26Feb24): * BP controlled.  - 120s  at home. White coat effect in office. No lightheadedness. No CP/SOB. Compliant with medications.  * He is taking torsemide 0.5 3x a week. * S/P ablation of left renal mass. Being followed yearly. * 160 mg albuminuria. CKD stable, creatinine 1.86.   Previous history (08Dec23): Labs from Dr. Ye: Cr 1.86. 160 mg albuminuria. * BP controlled.  - 130s at home. White coat effect. No lightheadedness. No CP/SOB. Compliant with medications. * S/P ablation of left renal mass. Being followed yearly. * No further episodes of low temperatures. Baseline is 95 - 96. No chills. * He is taking torsemide 0.5 5x a week.   Previous history (18Sep23): eGFR 37 (CKD-EPIcr & CKD-EPIcys avg). Urine albumin 58. * BP controlled.  - 120s at home. No lightheadedness. No CP/SOB. Compliant with medications. * S/P ablation of left renal mass. Being followed yearly. * 5x nocturia. Following up with urology. NO polyuria during the daytime.  * No further episodes of low temperatures. Baseline is 95 - 96. No chills.   Previous history (17Jul23): * Went to ER for low temp of unclear cause. No signs of sepsis. Now normalized. * eGFR 34. 58 * HGA1c controlled. * S/P ablation of left renal mass. Being followed yearly. * 5x nocturia. Following up with urology. NO polyuria during the daytime.   Previous history (12May23): eGFR 41 based on cystatin C of 1.56 (EK eGFRcys equation, [facility] 2023). * HTN controlled. BP 110s at home. No lightheadedness. No CP/SOB. Compliant with medications.  * S/P ablation of left renal mass. 1.1 cm renal mass in right kidney being followed.   Previous history (11Apr23): eGFR 39 based on cystatin C of 1.65 (EK eGFRcys equation, [facility] 2023).* HTN controlled. BP 110s at home. No lightheadedness. No CP/SOB. Compliant with medications. * S/P ablation of 2.6 cm left renal mass. He is following up with radiologist fo surveillance.   Previous history (11Jan23): * CKD stable, creatinine 1.83 in October 2022. * HTN controlled. BP 110s at home. No lightheadedness. No CP/SOB. Compliant with medications. * S/P ablation of 2.6 cm left renal mass. He is following up with radiologist fo surveillance.   Previous history (09Nov22): * CKD stable, creatinine 1.83 on 10/22 with Dr. Ye. ** HTN controlled. BP 110s at home. No lightheadedness. No CP/SOB. Compliant with medications. * S/P ablation of 2.6 cm left renal mass. He is following up with radiologist fo surveillance.   Previous history (20Njg50): * CKD stable, eGFR 32.5. * HTN controlled.  - 130s at home. No lightheadedness. No CP/SOB. Compliant with medications. * He is following up with radiologist for renal cell cancer surveillance. (S/P ablation of 2.6 cm left kidney mass).   Previous history ([id number]): * HTN controlled.  - 120s at home. No lightheadedness. No CP/SOB. Compliant with medications. * CKD stable, creatinine 1.86. * He is following up with radiologist for renal cell cancer surveillance. (S/P ablation of 2.6 cm left kidney mass).  Previous history (01Jun22): * Covid 1 month ago treated with renal-dose Paxlovid. No symptoms currently. * * HTN controlled.  - 120s at home. No lightheadedness. No CP/SOB. Compliant with medications. * CKD stable, creatinine 1.86.  * He is following up with radiologist for renal cell cancer surveillance. (S/P ablation of 2.6 cm left kidney mass).  Previous history (18Mar22): * HTN controlled.  - 120s at home. No lightheadedness. Compliant with medications. * He is following up with radiologist for renal cell cancer surveillance. (S/P ablation of 2.6 cm left kidney mass). * CKD stable, creatinine 2.02.   Previous history (12Oyc92): He is following up with radiologist for renal cell cancer surveillance. (S/P ablation of 2.6 cm left kidney mass).  He  * HTN controlled. No lightheadedness.  - 120s at home. Compliant with medications. * Labs from Dr. Ye reviewed. 11/15: Creatinine 2.05.   Previous history (74Bxs10): * CKD stable, creatinine 1.94 on 8/31. *  HTN controlled.  - 120s at home. No lightheadedness. Compliant with medications. * He is following up with radiologist for renal cell cancer surveillance. (S/P ablation of 2.6 cm left kidney mass).  Previous history (13Aug21): * CKD stable, creatinine 2.18. * * HTN controlled. No lightheadedness. Compliant with medications.  * He is following up with radiologist for renal cell cancer surveillance. (S/P ablation of 2.6 cm left kidney mass).  Previous history (08Jul21): * HTN controlled.  - 120s at home. No lightheadedness. Compliant with medications. * CKD stable.  * He is following up with radiologist for renal cell cancer surveillance. (S/P ablation of 2.6 cm left kidney mass).  Previous history (09Jun21): * HTN uncontrolled.  - 150s at home. No lightheadedness. Compliant with medications. * Stopped farxiga due to arm erythema, which has now resolved. * He is following up with radiologist for renal cell cancer surveillance. (S/P ablation of 2.6 cm left kidney mass). * CKD stable, creatinine 1.91.   Previous history (17May21): * CKD stable, creatinine 1.9. * He is following up with radiologist for renal cell cancer surveillance. (S/P ablation of 2.6 cm left kidney mass). * HTN uncontrolled at home, 120s - 150s. Unable to tolerate clonidine.  * Started farxiga once and developed bilateral arm redness without other pain. This has now resolving and he is considering starting again when it completely resolves.   Previous history (06May21): * CKD stable, creatinine 1.9 (eGFR 32). 109 mg albuminuria. * He is following up with radiologist for renal cell cancer surveillance. (S/P ablation of 2.6 cm left kidney mass). * HTN uncontrolled at home, 120s - 150s. Unable to tolerate clonidine.   Previous history (29Mar21): * CKD stable, creatinine 1.9.  * He is following up with radiologist for renal cell cancer surveillance. (S/P ablation of 2.6 cm left kidney mass). * HTN previously borderline controlled.  with Dr. Huang. No lightheadedness. BP 110s - 130 at home.   Previous history (20Jan21): * Moderna vaccine given yesterday. * CKD stable, creatinine 1.94 (eGFR 31). * He is following up with radiologist for renal cell cancer surveillance. (S/P ablation of 2.6 cm left kidney mass).  * HTN controlled. No lightheadedness.   Previous history (41Ifj81): * CKD progressive, creatinine 1.94 (eGFR 31). * He is following up with radiologist for renal cell cancer surveillance. (S/P ablation of 2.6 cm left kidney mass). * 200 - 300 mg proteinuria. * Losartan decreased to 50 and amlodipine increased to 10 by Dr. Huang [id number]. BP 110s/70s. No lightheadedness.  * Diplopia due to NPH improving, S/P  shunt. * Anemia improved.   Previous history (21Oct20): * Diplopia due to NPH improving, S/P  shunt. * CKD progressive, creatinine 1.9 eGFR 34. *  PTH 75, Ca 9.3. * HTN controlled, 110 - 120s/70s. No lightheadedness. * He is following up with radiologist for renal cell cancer surveillance. (S/P ablation of 2.6 cm left kidney mass).  Previous history (17Aug20): * 75 ounces of urine daily.  617 osm urine. Urine decreases when he drinks less. * CKD stable, creatinine 1.52, eGFR 33 by cystatin C. * HTN now controlled, average  / 70s. Still on amlodipine 5 mg qd. Now off dexamethasone.   Previous history (38Asl77): *  shunt placed for NPH. Now has double vision. Started on dexamethasone 5 days ago. * Creatinine increased to 1.86 (eGFR 33) on 06Hoy20. U/A normal. * He is following up with radiologist for renal cell cancer surveillance. (S/P ablation of 2.6 cm left kidney mass). * SBP now 140s. He had previously been on amlodipine 7.5 and is now on amlodipine 5. BP at home 120s. * HE WAKES UP 4 - 6 TIMES A NIGHT AND URINATES 2 LITERS. He is following with Cristian Sarmiento (urologist). PVR 82. SG 1.014. SNa 142. Reportedly he urinates < 4 times daily. Denies increased thirst.   Previous history (26May20): * CKD stable, creatinine previously 1.49. He has not had labs in 5 months. * Diagnosed with PD. Following up with Dr. Nino. * MRI scheduled for renal cell cancer surveillance. *   Previous history (13Apr20): * Average BP at home 120s - 130s. No lightheadedness. Compliant. * CKD stable, creatinine 1.46. * No muscle aching or weakness on statin. * He is following up with radiologist for renal cell cancer surveillance.   Previous history (01Oct19): * 1 - 2 weeks ago he developed URI with slight diarrhea, which is now resolving. * CKD previously stable, creatinine 1.88. * HTN controlled. No lightheadedness. Compliant with medications.   Previous history (17Sep19): * Creatinine increased to 1.88 in 16August19 and decreased to 1.66. * HTN borderline controlled. No lightheadedness. Compliant with medications. * He has been evaluated by Dr. Meek for monoclonal gammopathy.   Previous history (16Aug19): * Following up with Dr. Kwan s/p ablation of left renal cell carcinoma. * CKD previous stable, creatinine 1.59 in 6/27/19.. * HTN controlled. No lightheadedness. Compliant with medications. * he has been evaluated by Dr. Meek for monoclonal gammopathy.  Previous history (16May19): * Underwent ablation of left renal cell carcinoma. * CKD stable, creatinine 1.52. * HTN controlled.  - 130s at home.  No lightheadedness. Compliant with medications.    Previous history (03Apr19): * Scheduled for ablation for left renal cell carcinoma (2.6 cm). * CKD stable, creatinine 1.42. * HTN uncontrolled in office, SBP 120s - 130s average at home.  SBP with Dr. Huang.  No lightheadedness. Compliant with medications. Following low salt diet. * He believes amlodipine causing constipation and LE edema and would like to stop this.   Previous history (14Feb19): * Since 2014, his creatinine has ranged from 1.4 - 1.7. Most recently, his creatinine was 1.48 (GFR 44). He is unaware of proteinuria or hematuria. The patient denies exposure to chronic NSAIDs, PPIs, green smoothies, creatine, or herbal supplements. The patient denies a history of kidney stones or UTIs.  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 No evidence of myeloma.    Previous chart reviewed and summarized.   Options for clinical preventative services  Moderna 20Jan21, Booster;  Bivalent 2022  Influenza: September 2020, sep 2022  Pneumonia: given x 2 Shingles: shingrix  TDAP: Colonoscopy: 2018 PSA: by urology Dermatologist: advised to see yearly

## 2025-03-10 NOTE — REVIEW OF SYSTEMS
[Lower Ext Edema] : lower extremity edema [Constipation] : constipation [Depression] : depression [Negative] : Heme/Lymph

## 2025-03-12 NOTE — DISCUSSION/SUMMARY
[EKG obtained to assist in diagnosis and management of assessed problem(s)] : EKG obtained to assist in diagnosis and management of assessed problem(s) [FreeTextEntry1] : 87 year old Male with PMHx of HLD, HTN, aortic regurgitation, CKD, COPD, pulmonary hypertension, hypothyroidism, anemia, and venous reflux presents today for follow up and echocardiogram.  EKG today: sinus bradycardia 54 bpm  HTN: BP goal < 130/80, mildly elevated in office and at home but this is acceptable given the patient's age. continue Amlodipine 5 mg qd, Losartan 50 mg BID, and Torsemide 2.5 mg qd. Encouraged low salt diet and aerboic exercise as tolerated. HLD / CAD risk: Good exercise tolerance without chest pain or dyspnea. EKG sinus bradycardia. Stress echocardiogram negative for ischemia in 2018. Echocardiogram performed in office today shows normal LV systolic function without regional wall motion abnormalities. Significant coronary disease is unlikely. Will continue cardiac risk factor optimization. LDL goal < 100, most recent LDL 75, controlled. Conitnue Rosuvastatin 10 mg qd, healthy diet low in saturated fats and aerobic exercise as tolerated.  Venous reflux: Duplex venous ultrasound in 7/2024 revealed venous reflux of left great saphenous vein. Continue conservative measures such as elevation, low salt diet, and compression stockings.  CKD / renal cell carcinoma: Patient scheduled for repeat MRA in April. Conitnue to follow up with nephrologist Dr. Guzman and urologist Dr. Wiley.  Follow up 4 months  I have discussed the case with ANDER Saldivar. I have personally performed a history, physical exam, and my own medical decision making. I have reviewed the note and agree with the findings and plan.   The patient is physically active without difficulty.  EKG is normal.  The echocardiogram shows an ejection fraction of 55 to 60% with mild left ventricular hypertrophy and mild pulmonary hypertension.  The cholesterol has been good.  The patient will continue rosuvastatin.

## 2025-03-12 NOTE — HISTORY OF PRESENT ILLNESS
[FreeTextEntry1] : 87 year old Male with PMHx of HLD, HTN, aortic regurgitation, CKD, COPD, pulmonary hypertension,  hypothyroidism, anemia, and venous reflux presents today for follow up and echocardiogram.  Since last visit 11/11/2024, he has continued to follow up with his urologist Dr. Ema Wiley regarding right renal mass. He is due for repeat MRI in April. Overall he remains well and has no acute concerns. He continues to be active by walking. Most days he walks 12 blocks but states that he can walk as long as 1 hour. He walks up 5 flights of stairs in his building daily. He has some knee pain with stairs but no chest pain or dyspnea. He measures his blood pressure at home which ranges 110-140/70s. He has been eating less salt. He feels off balanced while walking, this is rare. Denies falls or syncope.  Patient denies any chest pain or shortness of breath at rest, palpitations, orthopnea, PND, dizziness, falls, syncope, other neuro focal deficits. ================================================================= Previous workup: Labs (2/2025):  HDL 46  LDL 75 K 4 Cr 2.36 BUN 44 GFR 26 TSH 1.88 H/H 12.7/39.2 A1c 6%  Duplex venous US (7/2024):  1. Right: no evidence of right lower extremity deep or superficial venous thrombosis. 2. No evidence of venous reflux in the right lower extremity. 3. Left: no evidence of left lower extremity deep venous thrombosis. 4. Left Great Saphenous Vein (Calf): Venous reflux is noted.  Echo (12/2023): 1. Left ventricular cavity is normal. Left ventricular systolic function is normal with an ejection fraction visually estimated at 55 to 60 %. There are no regional wall motion abnormalities seen. 2. There is mild (grade 1) left ventricular diastolic dysfunction, with normal filling pressure. 3. Normal right ventricular cavity size, wall thickness, and systolic function. 4. The right atrium is dilated in size. 5. Mild aortic regurgitation. 6. Mild left ventricular hypertrophy. 7. Mild pulmonary hypertension.  Carotid US (8/2022): bilateral proximal ICA 1-19% stenosis, angegrade flow bilaterally of vertebral arteries, no significant atherosclerosis of subclavian arteries bilaterally  Stress echo (2018): no chest discomfort, no significant EKG changes, normal augmentation of wall motion in all left ventricular segments, normal exercise echocardiogram.

## 2025-04-16 NOTE — ASSESSMENT
[FreeTextEntry1] : 88-year-old gentleman with PMH of hypothyroidism, chronic anemia, CKD stage III, hx of RCC, HTN, HLD, depression, glaucoma, possible nephrolithiasis, who presents for follow up for 1.5 right renal mass  1. 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 - recent MRi Cannot totally exclude a low level of enhancement along the inferior aspect of the post ablation cavity in left upper lobe pole kidney, we will repeat MRI in 3 months - MRI 4/25 unchanged since july 7/24  - US in 1 year and MRI in 2 years  2. Nocturia  - voiding dairy (didn't do it again)  - consider MAUREEN as cause- per patient had MAUREEN ruled out by pulmonologist in past - alfuzosin nightly, explained risk of orthostatic hypotension, dizziness, headache, back pain, nausea and retrograde ejaculation. Will stop the medication if experiencing side effects  Follow up in 3 months with Bibiana Follow up with Dr Wiley in 9 months  Thank you very much for allowing me to assist in the care of this patient. Please do not hesitate to contact me with any additional questions or concerns.     Sincerely,     George Wiley D.O. Professor of Urology and Radiology  of Urology at [facility] Director for Prostate Cancer [address] [place] [address], [address] [facility], [facility] Phone: [phone]   I saw and examined/evaluated the patient with the resident ((Scott Bo MD (PGY 6)) discussed w/ resident and agree with findings.

## 2025-04-16 NOTE — ASSESSMENT
[FreeTextEntry1] : 88-year-old gentleman with PMH of hypothyroidism, chronic anemia, CKD stage III, hx of RCC, HTN, HLD, depression, glaucoma, possible nephrolithiasis, who presents for follow up for 1.5 right renal mass  1. 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 - recent MRi Cannot totally exclude a low level of enhancement along the inferior aspect of the post ablation cavity in left upper lobe pole kidney, we will repeat MRI in 3 months - MRI 4/25 unchanged since july 7/24  - US in 1 year and MRI in 2 years  2. Nocturia  - voiding dairy (didn't do it again)  - consider MAUREEN as cause- per patient had MAUREEN ruled out by pulmonologist in past - alfuzosin nightly, explained risk of orthostatic hypotension, dizziness, headache, back pain, nausea and retrograde ejaculation. Will stop the medication if experiencing side effects  Follow up in 3 months with Bibiana Follow up with Dr Wiley in 9 months  Thank you very much for allowing me to assist in the care of this patient. Please do not hesitate to contact me with any additional questions or concerns.     Sincerely,     George Wiley D.O. Professor of Urology and Radiology  of Urology at [facility] Director for Prostate Cancer [address] [place] [address], [address] Floor [facility], [facility][note 3] Phone: [phone]   I saw and examined/evaluated the patient with the resident ((Scott Bo MD (PGY 6)) discussed w/ resident and agree with findings.

## 2025-04-16 NOTE — HISTORY OF PRESENT ILLNESS
[FreeTextEntry1] : Dear Dr. Guzman (nephrologist),  Thank you so much for the referral to help care for your patient.  Chief Complaint: 1.5cm right renal mass (stable) Date of first visit: 7/17/24  Barry P. Dubin is a 88-year-old gentleman with PMH of hypothyroidism, chronic anemia, CKD stage III, hx of RCC, HTN, HLD, depression, glaucoma, possible nephrolithiasis, who presents for 1.5cm right renal mass. Brother with Kidney Cancer. the interval imaging May 2024 to August 2024 (stable). Previously patient of Dr. Kwan, renal US ordered by nephrology for CKD showed 2.6cm left renal mass (2019), s/p image guided microwave ablation by Dr. Bradnyn Keating on 4/15/19. 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  Allergies: Farxiga and sertraline  PSA Hx: none  MRI Hx: MRI Abdomen at [facility] on 04/09/2025 -  1. Since July 7, 2024, unchanged right upper renal pole lesion, slightly increased since 2023, in keeping with cortical renal neoplasm, probably papillary renal cell carcinoma. 2. No suspicious finding in left renal ablation bed.  MRI Abdomen at [facility] on 01/07/2025 - 1. Indeterminate, internal signal changes of the post ablation cavity left upper lobe pole. Cannot totally exclude a low level of enhancement along the inferior aspect of the lesion. Recommend continued follow-up 2. Stable 1.5 cm mass upper pole right kidney, indeterminate. Recommend continued follow-up.  MRI August 1st 2024. The renal mass is stable over 3 month interval.  MRI Abdomen at [facility] on 05/02/2024. - 1. Since 4/8/2024, no significant change in appearance of ablation cavity along medial aspect of upper pole of left kidney. No evidence of recurrent cancer at this site. 2. A 1.5 cm mass arising from the upper pole of the right kidney is suspicious for an additional site of papillary renal cell carcinoma. 3. There are again a few hemorrhagic cysts in each kidney in additional multiple simple cysts. 4. Small ascites. 5. No change small right adrenal nodule, probably a lipid poor adenoma.  04/16/2025 IPSS  QOL  VALERIE   1/15/25 IPSS10 QOL 3 VALERIE 13 max flow 5.4 ml/s, ave 3.0 ml/s, VV 20.9cc nondiagnostic, PVR 14cc  The patient denies fevers, chills, nausea and or vomiting and no unexplained weight loss.  All pertinent laboratory, films and physician notes were reviewed. Questionnaire results were discussed with patient.

## 2025-04-16 NOTE — HISTORY OF PRESENT ILLNESS
[FreeTextEntry1] : Dear Dr. Guzman (nephrologist),  Thank you so much for the referral to help care for your patient.  Chief Complaint: 1.5cm right renal mass (stable) Date of first visit: 7/17/24  Barry P. Dubin is a 88-year-old gentleman with PMH of hypothyroidism, chronic anemia, CKD stage III, hx of RCC, HTN, HLD, depression, glaucoma, possible nephrolithiasis, who presents for 1.5cm right renal mass. Brother with Kidney Cancer. the interval imaging May 2024 to August 2024 (stable). Previously patient of Dr. Kwan, renal US ordered by nephrology for CKD showed 2.6cm left renal mass (2019), s/p image guided microwave ablation by Dr. Brandyn Keating on 4/15/19. 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  Allergies: Farxiga and sertraline  PSA Hx: none  MRI Hx: MRI Abdomen at [facility] on 04/09/2025 -  1. Since July 7, 2024, unchanged right upper renal pole lesion, slightly increased since 2023, in keeping with cortical renal neoplasm, probably papillary renal cell carcinoma. 2. No suspicious finding in left renal ablation bed.  MRI Abdomen at [facility] on 01/07/2025 - 1. Indeterminate, internal signal changes of the post ablation cavity left upper lobe pole. Cannot totally exclude a low level of enhancement along the inferior aspect of the lesion. Recommend continued follow-up 2. Stable 1.5 cm mass upper pole right kidney, indeterminate. Recommend continued follow-up.  MRI August 1st 2024. The renal mass is stable over 3 month interval.  MRI Abdomen at [facility] on 05/02/2024. - 1. Since 4/8/2024, no significant change in appearance of ablation cavity along medial aspect of upper pole of left kidney. No evidence of recurrent cancer at this site. 2. A 1.5 cm mass arising from the upper pole of the right kidney is suspicious for an additional site of papillary renal cell carcinoma. 3. There are again a few hemorrhagic cysts in each kidney in additional multiple simple cysts. 4. Small ascites. 5. No change small right adrenal nodule, probably a lipid poor adenoma.  04/16/2025 IPSS  QOL  VALERIE   1/15/25 IPSS10 QOL 3 VALERIE 13 max flow 5.4 ml/s, ave 3.0 ml/s, VV 20.9cc nondiagnostic, PVR 14cc  The patient denies fevers, chills, nausea and or vomiting and no unexplained weight loss.  All pertinent laboratory, films and physician notes were reviewed. Questionnaire results were discussed with patient.

## 2025-04-18 NOTE — DATA REVIEWED
[de-identified] : ACC: [id number]     EXAM:  MR BRAIN   ORDERED BY: LEYDIMARYSE JEANASHANNA  PROCEDURE DATE:  04/09/2025    INTERPRETATION:  Technique: MRI of the brain was performed utilizing sagittal and axial T1, axial T2, axial gradient echo, axial and coronal FLAIR and diffusion imaging.  Comparison is made to a prior MRI performed on 2/28/2024.  Findings: There is artifact from the ventricular shunt catheter limits this evaluation.  There are tiny bilateral basal ganglia, external capsule, internal capsule, pontine and left thalamic chronic lacunar infarcts. There are mild patchy and punctate foci of T2 and Flair signal hyperintensities predominantly within the periventricular white matter and moderate changes within the candace, right greater than left that are nonspecific however likely representing mild to moderate microvascular disease in a patient of this age.. There is no evidence of mass-effect or midline shift. There is no evidence of intra or extra-axial fluid collection.  There is no evidence of acute infarction. Evaluation of the intracranial vascular flow-voids demonstrates that the right vertebral artery is dominant. The left vertebral artery flow-void is hypoplastic. Loss of signal within the V4 segment of the left vertebral artery that may represent stenosis or occlusion, unchanged..  Again noted is a ventricular shunt catheter coursing via a right frontal sofia hole with its tip mildly crossing the midline, abutting the septum pellucidum and at the region of the left foramen of Swain, unchanged. There are encephalomalacic changes and gliosis surrounding the catheter tract site, unchanged. The ventricles are unchanged in size. There is no evidence of hydrocephalus.  Gradient echo images demonstrate no evidence of abnormal stability the brain parenchyma.  Versus small right maxillary polyp versus retention cyst, unchanged. There is minimal to mild bilateral ethmoid mucosal thickening. The remaining visualized paranasal sinuses and bilateral mastoid air cells are clear.  Impression: Right frontal approach ventricular shunt catheter, unchanged. Prominence of the ventricles, unchanged. No evidence of hydrocephalus.  Mild to moderate microvascular disease. Basal ganglia, bilateral external capsule, internal capsule, a few pontine and left thalamic chronic lacunar infarcts  No evidence of acute infarction.

## 2025-04-18 NOTE — HISTORY OF PRESENT ILLNESS
[FreeTextEntry1] : 88 year old man with history of worsening gait who presented to [facility] ED. He also reported urinary urgency/stress incontinence and mild cognitive impairment. Brain imaging indicates ventriculomegaly.  He had subjective improvement in symptoms after high volume spinal tap and underwent  shunt placement on 6/25/2020 with Dr. Mendoza.  Postoperatively, he had diplopia and VI cranial nerve palsy, which has now resolved.  His  shunt was adjusted to 6 at prior visit due to worsening unsteady gait.  At prior visit, we reviewed MRI brain done without contrast on 6/1/21 which was stable. His  shunt was maintained at 6.  Follow up MRI brain from 11/19/21 showed a stable ventricular size. VPS set at 6.0 His gait remained the same. Denied falls. Denied headaches, dizziness, weakness. A repeat 6 month MRI brain (June 2022) was recommended.  4/29/22, patient returned to check at VPS site, states soreness around the lateral posterior site for the past 2 weeks. He denies gait issues, falls, headaches, dizziness, focal weakness. Denies diplopia. Denies abd pain. He is scheduled for his f/u MRI brain on 6/6/22. Shunt checked and set at 6.0 Pain can be due to scar tissue. Recommended he RTC after MRI to review.  6/6/2022 visit Pt presents with completed MRI. He endorses overall doing well. Denies headaches, gait issues, falls, dizziness, diplopia, new/ worsening focal neuro deficits. States his wife is having emphysema exacerbation after recent COVID and very stressed today.  6/6/23: Shunt setting checked at 6.0 today. MRI today showed stable ventricular size. Plan was made to repeat MRI brain wo in one year and follow up after.  2/27/24: Returns today due to worsening imbalance and walking. He states he "veers to the right" when walking. Denies falls. Denies headaches.  3/1/24: Returns to review MRI brain without contrast done on 2/28/24, which is stable. VPS = 6 He also reports worsening low back pain. He states he thinks he pulled a muscle in his back. Was unable to sleep last night due to pain. Denies radiating pain, numbness/tingling, weakness.  4/8/24 Comes in today with 2 months of progressive right sided abdominal pain and swelling and concerns about shunt catheter. He has new xrays to review. He is having a bowel movement every 3 days which is normal for him. VPS checked and at 6. Reviewed MRI brain w/o performed on 2/28/2024 and stable. Xray shunt series without acute findings. Do not recommend changing  shunt given history of subdural hygromas on prior MRIs after shunt adjustment. Will maintain  shunt at 6 In regards to the RLQ mass - recommended he be evaluated in the ER for progressive pain and palpable mass.   TODAY: patient is doing well. denies headaches, balance issues, dizziness, or bowel/bladder problems. Certas confirmed @ 6.

## 2025-04-18 NOTE — DATA REVIEWED
[de-identified] : ACC: [id number]     EXAM:  MR BRAIN   ORDERED BY: LEYDIMARYSE JEANASHANNA  PROCEDURE DATE:  04/09/2025    INTERPRETATION:  Technique: MRI of the brain was performed utilizing sagittal and axial T1, axial T2, axial gradient echo, axial and coronal FLAIR and diffusion imaging.  Comparison is made to a prior MRI performed on 2/28/2024.  Findings: There is artifact from the ventricular shunt catheter limits this evaluation.  There are tiny bilateral basal ganglia, external capsule, internal capsule, pontine and left thalamic chronic lacunar infarcts. There are mild patchy and punctate foci of T2 and Flair signal hyperintensities predominantly within the periventricular white matter and moderate changes within the candace, right greater than left that are nonspecific however likely representing mild to moderate microvascular disease in a patient of this age.. There is no evidence of mass-effect or midline shift. There is no evidence of intra or extra-axial fluid collection.  There is no evidence of acute infarction. Evaluation of the intracranial vascular flow-voids demonstrates that the right vertebral artery is dominant. The left vertebral artery flow-void is hypoplastic. Loss of signal within the V4 segment of the left vertebral artery that may represent stenosis or occlusion, unchanged..  Again noted is a ventricular shunt catheter coursing via a right frontal sofia hole with its tip mildly crossing the midline, abutting the septum pellucidum and at the region of the left foramen of Swain, unchanged. There are encephalomalacic changes and gliosis surrounding the catheter tract site, unchanged. The ventricles are unchanged in size. There is no evidence of hydrocephalus.  Gradient echo images demonstrate no evidence of abnormal stability the brain parenchyma.  Versus small right maxillary polyp versus retention cyst, unchanged. There is minimal to mild bilateral ethmoid mucosal thickening. The remaining visualized paranasal sinuses and bilateral mastoid air cells are clear.  Impression: Right frontal approach ventricular shunt catheter, unchanged. Prominence of the ventricles, unchanged. No evidence of hydrocephalus.  Mild to moderate microvascular disease. Basal ganglia, bilateral external capsule, internal capsule, a few pontine and left thalamic chronic lacunar infarcts  No evidence of acute infarction.

## 2025-04-18 NOTE — HISTORY OF PRESENT ILLNESS
[FreeTextEntry1] : 88 year old man with history of worsening gait who presented to [facility] ED. He also reported urinary urgency/stress incontinence and mild cognitive impairment. Brain imaging indicates ventriculomegaly.  He had subjective improvement in symptoms after high volume spinal tap and underwent  shunt placement on 6/25/2020 with Dr. Mendoza.  Postoperatively, he had diplopia and VI cranial nerve palsy, which has now resolved.  His  shunt was adjusted to 6 at prior visit due to worsening unsteady gait.  At prior visit, we reviewed MRI brain done without contrast on 6/1/21 which was stable. His  shunt was maintained at 6.  Follow up MRI brain from 11/19/21 showed a stable ventricular size. VPS set at 6.0 His gait remained the same. Denied falls. Denied headaches, dizziness, weakness. A repeat 6 month MRI brain (June 2022) was recommended.  4/29/22, patient returned to check at VPS site, states soreness around the lateral posterior site for the past 2 weeks. He denies gait issues, falls, headaches, dizziness, focal weakness. Denies diplopia. Denies abd pain. He is scheduled for his f/u MRI brain on 6/6/22. Shunt checked and set at 6.0 Pain can be due to scar tissue. Recommended he RTC after MRI to review.  6/6/2022 visit Pt presents with completed MRI. He endorses overall doing well. Denies headaches, gait issues, falls, dizziness, diplopia, new/ worsening focal neuro deficits. States his wife is having emphysema exacerbation after recent COVID and very stressed today.  6/6/23: Shunt setting checked at 6.0 today. MRI today showed stable ventricular size. Plan was made to repeat MRI brain wo in one year and follow up after.  2/27/24: Returns today due to worsening imbalance and walking. He states he "veers to the right" when walking. Denies falls. Denies headaches.  3/1/24: Returns to review MRI brain without contrast done on 2/28/24, which is stable. VPS = 6 He also reports worsening low back pain. He states he thinks he pulled a muscle in his back. Was unable to sleep last night due to pain. Denies radiating pain, numbness/tingling, weakness.  4/8/24 Comes in today with 2 months of progressive right sided abdominal pain and swelling and concerns about shunt catheter. He has new xrays to review. He is having a bowel movement every 3 days which is normal for him. VPS checked and at 6. Reviewed MRI brain w/o performed on 2/28/2024 and stable. Xray shunt series without acute findings. Do not recommend changing  shunt given history of subdural hygromas on prior MRIs after shunt adjustment. Will maintain  shunt at 6 In regards to the RLQ mass - recommended he be evaluated in the ER for progressive pain and palpable mass.   TODAY: patient is doing well. denies headaches, balance issues, dizziness, or bowel/bladder problems. Certas confirmed @ 6.

## 2025-04-18 NOTE — HISTORY OF PRESENT ILLNESS
[FreeTextEntry1] : 88 year old man with history of worsening gait who presented to [facility] ED. He also reported urinary urgency/stress incontinence and mild cognitive impairment. Brain imaging indicates ventriculomegaly.  He had subjective improvement in symptoms after high volume spinal tap and underwent  shunt placement on 6/25/2020 with Dr. Mendoza.  Postoperatively, he had diplopia and VI cranial nerve palsy, which has now resolved.  His  shunt was adjusted to 6 at prior visit due to worsening unsteady gait.  At prior visit, we reviewed MRI brain done without contrast on 6/1/21 which was stable. His  shunt was maintained at 6.  Follow up MRI brain from 11/19/21 showed a stable ventricular size. VPS set at 6.0 His gait remained the same. Denied falls. Denied headaches, dizziness, weakness. A repeat 6 month MRI brain (June 2022) was recommended.  4/29/22, patient returned to check at VPS site, states soreness around the lateral posterior site for the past 2 weeks. He denies gait issues, falls, headaches, dizziness, focal weakness. Denies diplopia. Denies abd pain. He is scheduled for his f/u MRI brain on 6/6/22. Shunt checked and set at 6.0 Pain can be due to scar tissue. Recommended he RTC after MRI to review.  6/6/2022 visit Pt presents with completed MRI. He endorses overall doing well. Denies headaches, gait issues, falls, dizziness, diplopia, new/ worsening focal neuro deficits. States his wife is having emphysema exacerbation after recent COVID and very stressed today.  6/6/23: Shunt setting checked at 6.0 today. MRI today showed stable ventricular size. Plan was made to repeat MRI brain wo in one year and follow up after.  2/27/24: Returns today due to worsening imbalance and walking. He states he "veers to the right" when walking. Denies falls. Denies headaches.  3/1/24: Returns to review MRI brain without contrast done on 2/28/24, which is stable. VPS = 6 He also reports worsening low back pain. He states he thinks he pulled a muscle in his back. Was unable to sleep last night due to pain. Denies radiating pain, numbness/tingling, weakness.  4/8/24 Comes in today with 2 months of progressive right sided abdominal pain and swelling and concerns about shunt catheter. He has new xrays to review. He is having a bowel movement every 3 days which is normal for him. VPS checked and at 6. Reviewed MRI brain w/o performed on 2/28/2024 and stable. Xray shunt series without acute findings. Do not recommend changing  shunt given history of subdural hygromas on prior MRIs after shunt adjustment. Will maintain  shunt at 6 In regards to the RLQ mass - recommended he be evaluated in the ER for progressive pain and palpable mass.   TODAY: patient is doing well. denies headaches, balance issues, dizziness, or bowel/bladder problems. Certas confirmed @ 6.

## 2025-04-18 NOTE — HISTORY OF PRESENT ILLNESS
[FreeTextEntry1] : 88 year old man with history of worsening gait who presented to [facility] ED. He also reported urinary urgency/stress incontinence and mild cognitive impairment. Brain imaging indicates ventriculomegaly.  He had subjective improvement in symptoms after high volume spinal tap and underwent  shunt placement on 6/25/2020 with Dr. Mendoza.  Postoperatively, he had diplopia and VI cranial nerve palsy, which has now resolved.  His  shunt was adjusted to 6 at prior visit due to worsening unsteady gait.  At prior visit, we reviewed MRI brain done without contrast on 6/1/21 which was stable. His  shunt was maintained at 6.  Follow up MRI brain from 11/19/21 showed a stable ventricular size. VPS set at 6.0 His gait remained the same. Denied falls. Denied headaches, dizziness, weakness. A repeat 6 month MRI brain (June 2022) was recommended.  4/29/22, patient returned to check at VPS site, states soreness around the lateral posterior site for the past 2 weeks. He denies gait issues, falls, headaches, dizziness, focal weakness. Denies diplopia. Denies abd pain. He is scheduled for his f/u MRI brain on 6/6/22. Shunt checked and set at 6.0 Pain can be due to scar tissue. Recommended he RTC after MRI to review.  6/6/2022 visit Pt presents with completed MRI. He endorses overall doing well. Denies headaches, gait issues, falls, dizziness, diplopia, new/ worsening focal neuro deficits. States his wife is having emphysema exacerbation after recent COVID and very stressed today.  6/6/23: Shunt setting checked at 6.0 today. MRI today showed stable ventricular size. Plan was made to repeat MRI brain wo in one year and follow up after.  2/27/24: Returns today due to worsening imbalance and walking. He states he "veers to the right" when walking. Denies falls. Denies headaches.  3/1/24: Returns to review MRI brain without contrast done on 2/28/24, which is stable. VPS = 6 He also reports worsening low back pain. He states he thinks he pulled a muscle in his back. Was unable to sleep last night due to pain. Denies radiating pain, numbness/tingling, weakness.  4/8/24 Comes in today with 2 months of progressive right sided abdominal pain and swelling and concerns about shunt catheter. He has new xrays to review. He is having a bowel movement every 3 days which is normal for him. VPS checked and at 6. Reviewed MRI brain w/o performed on 2/28/2024 and stable. Xray shunt series without acute findings. Do not recommend changing  shunt given history of subdural hygromas on prior MRIs after shunt adjustment. Will maintain  shunt at 6 In regards to the RLQ mass - recommended he be evaluated in the ER for progressive pain and palpable mass.   TODAY: patient is doing well. denies headaches, balance issues, dizziness, or bowel/bladder problems. Certas confirmed @ 6.

## 2025-04-18 NOTE — PHYSICAL EXAM
[Person] : oriented to person [Place] : oriented to place [Time] : oriented to time [Short Term Intact] : short term memory intact [Remote Intact] : remote memory intact [Span Intact] : the attention span was normal [Concentration Intact] : normal concentrating ability [Fluency] : fluency intact [Comprehension] : comprehension intact [Current Events] : adequate knowledge of current events [Past History] : adequate knowledge of personal past history [Vocabulary] : adequate range of vocabulary [Cranial Nerves Optic (II)] : visual acuity intact bilaterally,  pupils equal round and reactive to light [Cranial Nerves Oculomotor (III)] : extraocular motion intact [Cranial Nerves Trigeminal (V)] : facial sensation intact symmetrically [Cranial Nerves Facial (VII)] : face symmetrical [Cranial Nerves Vestibulocochlear (VIII)] : hearing was intact bilaterally [Cranial Nerves Glossopharyngeal (IX)] : tongue and palate midline [Cranial Nerves Accessory (XI - Cranial And Spinal)] : head turning and shoulder shrug symmetric [Cranial Nerves Hypoglossal (XII)] : there was no tongue deviation with protrusion [Motor Tone] : muscle tone was normal in all four extremities [Motor Strength] : muscle strength was normal in all four extremities [No Muscle Atrophy] : normal bulk in all four extremities [Sensation Tactile Decrease] : light touch was intact [Abnormal Walk] : normal gait [Balance] : balance was intact [2+] : Patella left 2+ [Over the Past 2 Weeks, Have You Felt Down, Depressed, or Hopeless?] : 1.) Over the past 2 weeks, have you felt down, depressed, or hopeless? No [Over the Past 2 Weeks, Have You Felt Little Interest or Pleasure Doing Things?] : 2.) Over the past 2 weeks, have you felt little interest or pleasure doing things? No [Past-pointing] : there was no past-pointing [Tremor] : no tremor present

## 2025-04-18 NOTE — ASSESSMENT
[FreeTextEntry1] : Jess confirmed @ 6.   - MRI brain without due in 1 year (April 2026) - RTC once completed  Patient and patient's family verbalize agreement and understanding with plan of care.  I, Dr. Dl Mendoza, personally performed the evaluation and management (E/M) services for this established patient who presents today with (a) new problem(s)/exacerbation of (an) existing condition(s). That E/M includes conducting the clinically appropriate interval history &/or exam, assessing all new/exacerbated conditions, and establishing a new plan of care. Today, my LORIN, Kirill HannonBenbrianafisa, was here to observe my evaluation and management service for this new problem/exacerbated condition and follow the plan of care established by me going forward.

## 2025-04-18 NOTE — ASSESSMENT
[FreeTextEntry1] : Jess confirmed @ 6.   - MRI brain without due in 1 year (April 2026) - RTC once completed  Patient and patient's family verbalize agreement and understanding with plan of care.  I, Dr. Dl Mendoza, personally performed the evaluation and management (E/M) services for this established patient who presents today with (a) new problem(s)/exacerbation of (an) existing condition(s). That E/M includes conducting the clinically appropriate interval history &/or exam, assessing all new/exacerbated conditions, and establishing a new plan of care. Today, my LORIN, Kirill HannonGLOGnafisa, was here to observe my evaluation and management service for this new problem/exacerbated condition and follow the plan of care established by me going forward.

## 2025-04-18 NOTE — PHYSICAL EXAM
[Person] : oriented to person [Place] : oriented to place [Time] : oriented to time [Short Term Intact] : short term memory intact [Remote Intact] : remote memory intact [Span Intact] : the attention span was normal [Concentration Intact] : normal concentrating ability [Fluency] : fluency intact [Comprehension] : comprehension intact [Current Events] : adequate knowledge of current events [Past History] : adequate knowledge of personal past history [Vocabulary] : adequate range of vocabulary [Cranial Nerves Optic (II)] : visual acuity intact bilaterally,  pupils equal round and reactive to light [Cranial Nerves Oculomotor (III)] : extraocular motion intact [Cranial Nerves Trigeminal (V)] : facial sensation intact symmetrically [Cranial Nerves Facial (VII)] : face symmetrical [Cranial Nerves Vestibulocochlear (VIII)] : hearing was intact bilaterally [Cranial Nerves Glossopharyngeal (IX)] : tongue and palate midline [Cranial Nerves Accessory (XI - Cranial And Spinal)] : head turning and shoulder shrug symmetric [Motor Tone] : muscle tone was normal in all four extremities [Cranial Nerves Hypoglossal (XII)] : there was no tongue deviation with protrusion [Motor Strength] : muscle strength was normal in all four extremities [No Muscle Atrophy] : normal bulk in all four extremities [Sensation Tactile Decrease] : light touch was intact [Abnormal Walk] : normal gait [Balance] : balance was intact [2+] : Patella left 2+ [Over the Past 2 Weeks, Have You Felt Down, Depressed, or Hopeless?] : 1.) Over the past 2 weeks, have you felt down, depressed, or hopeless? No [Over the Past 2 Weeks, Have You Felt Little Interest or Pleasure Doing Things?] : 2.) Over the past 2 weeks, have you felt little interest or pleasure doing things? No [Past-pointing] : there was no past-pointing [Tremor] : no tremor present

## 2025-04-18 NOTE — ASSESSMENT
[FreeTextEntry1] : Jess confirmed @ 6.   - MRI brain without due in 1 year (April 2026) - RTC once completed  Patient and patient's family verbalize agreement and understanding with plan of care.  I, Dr. Dl Mendoza, personally performed the evaluation and management (E/M) services for this established patient who presents today with (a) new problem(s)/exacerbation of (an) existing condition(s). That E/M includes conducting the clinically appropriate interval history &/or exam, assessing all new/exacerbated conditions, and establishing a new plan of care. Today, my LORIN, Kirill HannonBioniznafisa, was here to observe my evaluation and management service for this new problem/exacerbated condition and follow the plan of care established by me going forward.

## 2025-04-18 NOTE — ASSESSMENT
[FreeTextEntry1] : Jess confirmed @ 6.   - MRI brain without due in 1 year (April 2026) - RTC once completed  Patient and patient's family verbalize agreement and understanding with plan of care.  I, Dr. Dl Mendoza, personally performed the evaluation and management (E/M) services for this established patient who presents today with (a) new problem(s)/exacerbation of (an) existing condition(s). That E/M includes conducting the clinically appropriate interval history &/or exam, assessing all new/exacerbated conditions, and establishing a new plan of care. Today, my LORIN, Kirill HannonMillennium Laboratoriesnafisa, was here to observe my evaluation and management service for this new problem/exacerbated condition and follow the plan of care established by me going forward.

## 2025-04-18 NOTE — DATA REVIEWED
[de-identified] : ACC: [id number]     EXAM:  MR BRAIN   ORDERED BY: LEYDIMARYSE JEANASHANNA  PROCEDURE DATE:  04/09/2025    INTERPRETATION:  Technique: MRI of the brain was performed utilizing sagittal and axial T1, axial T2, axial gradient echo, axial and coronal FLAIR and diffusion imaging.  Comparison is made to a prior MRI performed on 2/28/2024.  Findings: There is artifact from the ventricular shunt catheter limits this evaluation.  There are tiny bilateral basal ganglia, external capsule, internal capsule, pontine and left thalamic chronic lacunar infarcts. There are mild patchy and punctate foci of T2 and Flair signal hyperintensities predominantly within the periventricular white matter and moderate changes within the candace, right greater than left that are nonspecific however likely representing mild to moderate microvascular disease in a patient of this age.. There is no evidence of mass-effect or midline shift. There is no evidence of intra or extra-axial fluid collection.  There is no evidence of acute infarction. Evaluation of the intracranial vascular flow-voids demonstrates that the right vertebral artery is dominant. The left vertebral artery flow-void is hypoplastic. Loss of signal within the V4 segment of the left vertebral artery that may represent stenosis or occlusion, unchanged..  Again noted is a ventricular shunt catheter coursing via a right frontal sofia hole with its tip mildly crossing the midline, abutting the septum pellucidum and at the region of the left foramen of Swain, unchanged. There are encephalomalacic changes and gliosis surrounding the catheter tract site, unchanged. The ventricles are unchanged in size. There is no evidence of hydrocephalus.  Gradient echo images demonstrate no evidence of abnormal stability the brain parenchyma.  Versus small right maxillary polyp versus retention cyst, unchanged. There is minimal to mild bilateral ethmoid mucosal thickening. The remaining visualized paranasal sinuses and bilateral mastoid air cells are clear.  Impression: Right frontal approach ventricular shunt catheter, unchanged. Prominence of the ventricles, unchanged. No evidence of hydrocephalus.  Mild to moderate microvascular disease. Basal ganglia, bilateral external capsule, internal capsule, a few pontine and left thalamic chronic lacunar infarcts  No evidence of acute infarction.

## 2025-04-18 NOTE — DATA REVIEWED
[de-identified] : ACC: [id number]     EXAM:  MR BRAIN   ORDERED BY: LEYDIMARYSE JEANASHANNA  PROCEDURE DATE:  04/09/2025    INTERPRETATION:  Technique: MRI of the brain was performed utilizing sagittal and axial T1, axial T2, axial gradient echo, axial and coronal FLAIR and diffusion imaging.  Comparison is made to a prior MRI performed on 2/28/2024.  Findings: There is artifact from the ventricular shunt catheter limits this evaluation.  There are tiny bilateral basal ganglia, external capsule, internal capsule, pontine and left thalamic chronic lacunar infarcts. There are mild patchy and punctate foci of T2 and Flair signal hyperintensities predominantly within the periventricular white matter and moderate changes within the candace, right greater than left that are nonspecific however likely representing mild to moderate microvascular disease in a patient of this age.. There is no evidence of mass-effect or midline shift. There is no evidence of intra or extra-axial fluid collection.  There is no evidence of acute infarction. Evaluation of the intracranial vascular flow-voids demonstrates that the right vertebral artery is dominant. The left vertebral artery flow-void is hypoplastic. Loss of signal within the V4 segment of the left vertebral artery that may represent stenosis or occlusion, unchanged..  Again noted is a ventricular shunt catheter coursing via a right frontal sofia hole with its tip mildly crossing the midline, abutting the septum pellucidum and at the region of the left foramen of Swain, unchanged. There are encephalomalacic changes and gliosis surrounding the catheter tract site, unchanged. The ventricles are unchanged in size. There is no evidence of hydrocephalus.  Gradient echo images demonstrate no evidence of abnormal stability the brain parenchyma.  Versus small right maxillary polyp versus retention cyst, unchanged. There is minimal to mild bilateral ethmoid mucosal thickening. The remaining visualized paranasal sinuses and bilateral mastoid air cells are clear.  Impression: Right frontal approach ventricular shunt catheter, unchanged. Prominence of the ventricles, unchanged. No evidence of hydrocephalus.  Mild to moderate microvascular disease. Basal ganglia, bilateral external capsule, internal capsule, a few pontine and left thalamic chronic lacunar infarcts  No evidence of acute infarction.

## 2025-04-23 NOTE — ASSESSMENT
[FreeTextEntry1] : -- # HTN controlled. White coat effect.  * Continue torsemide 2.5 mg three times a week. (This is the dose he has been taking.) * Cont torsemide, losartan, amlodipine. * The patient's blood pressure was checked using the SPRINT trial protocol (with the [employer] [id number] or [employer] Theresa [id number]) after sitting quietly in an empty room with arm supported, back supported, and feet on the floor for 5 minutes. The average of 3 readings were taken. * A counseling information sheet on blood pressure and staying healthy has been given (which they have been instructed to read). * The patient has been counseled to check their BP at home with an automatic arm cuff, write down the readings, and reach me directly on the phone immediately if they are persistently > 180 systolic or if SBP is less than 100 or if lightheadedness develops. They were counseled to bring in all blood pressure readings and medications next visit. * The patient has been counseled that regular office follow-up (at least every 2 mo for now) is important for monitoring and for their health, and that it is their responsibility to make follow up appointments. * The patient also has been counseled that they must never stop or change any medications without discussing this with me (or another physician).    # CKD stage 3 due to aging/HTN with 124 mg albuminuria. * Recheck labs. * Not on SGLT2i due to rash. * Therapies for kidney disease: blood pressure control; proteinuria reduction with ARB/ACEi; other evidence-based therapies recommended including exercise, a plant-based lower oxalate diet, and 400 mcg folic acid daily * Cardiovascular disease prevention: counseling on healthy diet, physical activity, weight loss, alcohol limitation, blood pressure control; statin therapy; cardiology evaluation/followup advised * A counseling information sheet on CKD has been given (which they have been instructed to read). * The patient has been counseled that chronic kidney disease is a significant condition and regular office follow-up with me (at least every 2 months for now) is important for monitoring and their health, and that it is their responsibility to make a follow-up appointment. * The patient has been counseled never to stop taking their medications without discussing it with me or another doctor. * The patient has been counseled on avoiding NSAIDs. * The patient has been counseled on risk of worsening kidney function and instructed to immediately call and speak with me and go immediately to ER with any severe symptoms, nausea, vomiting, diarrhea, chest pain, or shortness of breath.  # Less than 3 cm kidney cancer.  * Follow up with radiology/urology.  # Hypokalemia. * Improved.  # Urolithiasis. * High fluid intake.  # Monoclonal protein. * Follow up with heme.  # Hyperpara. * Follow up PTH.

## 2025-04-23 NOTE — HISTORY OF PRESENT ILLNESS
[FreeTextEntry1] : * eGFR 35 (avg. of CKD-EPIcr & CKD-EPIcys). 176 mg albuminuria. *  He has seen hematologist for monocloanl protein (IgG). * No renal colic. * BP controlled.  - 130s at home. White coat effect in office. No SOB with exertion. No CP. No lightheadedness (pre-syncope). Adherent to medications.* Hypokalemia controlled. * PTH 85.   Previous history (03Mar25): eGFR 30 (avg. of CKD-EPIcr & CKD-EPIcys). *  He has seen hematologist for monocloanl protein (IgG). * No renal colic. * BP controlled.  - 130s at home. White coat effect in office. No SOB with exertion. No CP. No lightheadedness (pre-syncope). Adherent to medications.  *   Previous history (26Feb25): * eGFR 35 (avg. of CKD-EPIcr & CKD-EPIcys). 207 mg albuminuria. * He has seen hematologist for monocloanl protein (IgG). * No renal colic. * BP controlled.  - 130s at home. White coat effect in office. No SOB with exertion. No CP. No lightheadedness (pre-syncope). Adherent to medications.   Previous history (26Dec24): HTN borderline uncontrolled.  - 130s at home. White coat effect. No SOB with exertion. No CP. No lightheadedness (pre-syncope). Adherent to medications. * K normalized (4.2). CKD stable, creatinine 1.86, eGFR 35 by CKD-EPIcr. * He has seen hematologist for monocloanl protein (IgG). * No renal colic.   Previous history (13Sep24): * K 3.4. No history of hyperkalemia but he has been on strict low K diet. * eGFR 35 (avg. of CKD-EPIcr & CKD-EPIcys).   Previous history (09Sep24):  * Renal lesion being followed by urology and IR. * No abdominal discomfort.  * HTN uncontrolled. BP 110s - 120s at home. No SOB with exertion. No CP. No lightheadedness. Compliant with medications.* He has been taking torsemide 2.5 mg daily and wishes to stay on this dose.   Previous history (16Jul24):  * Renal lesion being followed by urology and IR. * Right sided discomfort on 4/8 and CT scan showed possible passed kidney stone on right that was in the bladder. Following up with urologist. No further colic. * HTN borderline uncontrolled. BP 120s - 130s at home. No SOB with exertion. No CP. No lightheadedness. Compliant with medications. Swelling was intolerable with amlodipine 10, which was reduced to 5. * No diuresis to torsemide 2.5.   Previous history (20May24): eGFR 38 (avg. of CKD-EPIcr & CKD-EPIcys). 207 mg albuminuria. * Renal lesion being followed by urology and IR. * BP controlled. BP 120s at home. No SOB with exertion. No CP. No lightheadedness. Compliant with medications. * Right sided discomfort on 4/8 and CT scan showed possible passed kidney stone on right that was in the bladder. Following up with urologist.   Previous history (09Mar24): BP controlled. BP 120s on average at home. White coat effect in office.No SOB with exertion. No CP. No lightheadedness. Compliant with medications.  * 400 mg proteinuria. eGFR 38 (avg. of CKD-EPIcr & CKD-EPIcys). * Right sided discomfort on 4/8 and CT scan showed possible passed kidney stone on right that was in the bladder. Following up with urologist.   Previous history (11Apr24): Following up with urologist. for stone. * Cr 1.77. * No flank pain now c/w kidney stone. * HTN controlled.   Previous history (09Apr24): Labs from 4/8/24 reviewed: creatinine1.77. BP controlled.  No SOB with exertion. No CP. No lightheadedness. Compliant with medications. * Right sided discomfort on 4/8 and CT scan showed possible passed kidney stone on right that was in the bladder.   Previous history (06Mar24): * CKD stable, creatinine 1.86. Feels well. * He is taking torsemide 0.5 3x a week. * S/P ablation of left renal mass. Being followed yearly. * BP controlled.  - 120s  at home.   Previous history (26Feb24): * BP controlled.  - 120s  at home. White coat effect in office. No lightheadedness. No CP/SOB. Compliant with medications.  * He is taking torsemide 0.5 3x a week. * S/P ablation of left renal mass. Being followed yearly. * 160 mg albuminuria. CKD stable, creatinine 1.86.   Previous history (08Dec23): Labs from Dr. Ye: Cr 1.86. 160 mg albuminuria. * BP controlled.  - 130s at home. White coat effect. No lightheadedness. No CP/SOB. Compliant with medications. * S/P ablation of left renal mass. Being followed yearly. * No further episodes of low temperatures. Baseline is 95 - 96. No chills. * He is taking torsemide 0.5 5x a week.   Previous history (18Sep23): eGFR 37 (CKD-EPIcr & CKD-EPIcys avg). Urine albumin 58. * BP controlled.  - 120s at home. No lightheadedness. No CP/SOB. Compliant with medications. * S/P ablation of left renal mass. Being followed yearly. * 5x nocturia. Following up with urology. NO polyuria during the daytime.  * No further episodes of low temperatures. Baseline is 95 - 96. No chills.   Previous history (17Jul23): * Went to ER for low temp of unclear cause. No signs of sepsis. Now normalized. * eGFR 34. 58 * HGA1c controlled. * S/P ablation of left renal mass. Being followed yearly. * 5x nocturia. Following up with urology. NO polyuria during the daytime.   Previous history (12May23): eGFR 41 based on cystatin C of 1.56 (EKFC eGFRcys equation, [facility] 2023). * HTN controlled. BP 110s at home. No lightheadedness. No CP/SOB. Compliant with medications.  * S/P ablation of left renal mass. 1.1 cm renal mass in right kidney being followed.   Previous history (11Apr23): eGFR 39 based on cystatin C of 1.65 (EKFC eGFRcys equation, [facility] 2023).* HTN controlled. BP 110s at home. No lightheadedness. No CP/SOB. Compliant with medications. * S/P ablation of 2.6 cm left renal mass. He is following up with radiologist fo surveillance.   Previous history (11Jan23): * CKD stable, creatinine 1.83 in October 2022. * HTN controlled. BP 110s at home. No lightheadedness. No CP/SOB. Compliant with medications. * S/P ablation of 2.6 cm left renal mass. He is following up with radiologist fo surveillance.   Previous history (09Nov22): * CKD stable, creatinine 1.83 on 10/22 with Dr. Ye. ** HTN controlled. BP 110s at home. No lightheadedness. No CP/SOB. Compliant with medications. * S/P ablation of 2.6 cm left renal mass. He is following up with radiologist fo surveillance.   Previous history (18Aug22): * CKD stable, eGFR 32.5. * HTN controlled.  - 130s at home. No lightheadedness. No CP/SOB. Compliant with medications. * He is following up with radiologist for renal cell cancer surveillance. (S/P ablation of 2.6 cm left kidney mass).   Previous history (02Aug22): * HTN controlled.  - 120s at home. No lightheadedness. No CP/SOB. Compliant with medications. * CKD stable, creatinine 1.86. * He is following up with radiologist for renal cell cancer surveillance. (S/P ablation of 2.6 cm left kidney mass).  Previous history (01Jun22): * Covid 1 month ago treated with renal-dose Paxlovid. No symptoms currently. * * HTN controlled.  - 120s at home. No lightheadedness. No CP/SOB. Compliant with medications. * CKD stable, creatinine 1.86.  * He is following up with radiologist for renal cell cancer surveillance. (S/P ablation of 2.6 cm left kidney mass).  Previous history (18Mar22): * HTN controlled.  - 120s at home. No lightheadedness. Compliant with medications. * He is following up with radiologist for renal cell cancer surveillance. (S/P ablation of 2.6 cm left kidney mass). * CKD stable, creatinine 2.02.   Previous history (08Dec21): He is following up with radiologist for renal cell cancer surveillance. (S/P ablation of 2.6 cm left kidney mass).  He  * HTN controlled. No lightheadedness.  - 120s at home. Compliant with medications. * Labs from Dr. Ye reviewed. 11/15: Creatinine 2.05.   Previous history ([id number]): * CKD stable, creatinine 1.94 on 8/31. *  HTN controlled.  - 120s at home. No lightheadedness. Compliant with medications. * He is following up with radiologist for renal cell cancer surveillance. (S/P ablation of 2.6 cm left kidney mass).  Previous history ([id number]): * CKD stable, creatinine 2.18. * * HTN controlled. No lightheadedness. Compliant with medications.  * He is following up with radiologist for renal cell cancer surveillance. (S/P ablation of 2.6 cm left kidney mass).  Previous history ([id number]): * HTN controlled.  - 120s at home. No lightheadedness. Compliant with medications. * CKD stable.  * He is following up with radiologist for renal cell cancer surveillance. (S/P ablation of 2.6 cm left kidney mass).  Previous history (09Jun21): * HTN uncontrolled.  - 150s at home. No lightheadedness. Compliant with medications. * Stopped farxiga due to arm erythema, which has now resolved. * He is following up with radiologist for renal cell cancer surveillance. (S/P ablation of 2.6 cm left kidney mass). * CKD stable, creatinine 1.91.   Previous history (17May21): * CKD stable, creatinine 1.9. * He is following up with radiologist for renal cell cancer surveillance. (S/P ablation of 2.6 cm left kidney mass). * HTN uncontrolled at home, 120s - 150s. Unable to tolerate clonidine.  * Started farxiga once and developed bilateral arm redness without other pain. This has now resolving and he is considering starting again when it completely resolves.   Previous history (06May21): * CKD stable, creatinine 1.9 (eGFR 32). 109 mg albuminuria. * He is following up with radiologist for renal cell cancer surveillance. (S/P ablation of 2.6 cm left kidney mass). * HTN uncontrolled at home, 120s - 150s. Unable to tolerate clonidine.   Previous history (29Mar21): * CKD stable, creatinine 1.9.  * He is following up with radiologist for renal cell cancer surveillance. (S/P ablation of 2.6 cm left kidney mass). * HTN previously borderline controlled.  with Dr. Huang. No lightheadedness. BP 110s - 130 at home.   Previous history (20Jan21): * Moderna vaccine given yesterday. * CKD stable, creatinine 1.94 (eGFR 31). * He is following up with radiologist for renal cell cancer surveillance. (S/P ablation of 2.6 cm left kidney mass).  * HTN controlled. No lightheadedness.   Previous history (28Dec20): * CKD progressive, creatinine 1.94 (eGFR 31). * He is following up with radiologist for renal cell cancer surveillance. (S/P ablation of 2.6 cm left kidney mass). * 200 - 300 mg proteinuria. * Losartan decreased to 50 and amlodipine increased to 10 by Dr. Huang 11Dec20. BP 110s/70s. No lightheadedness.  * Diplopia due to NPH improving, S/P  shunt. * Anemia improved.   Previous history (21Oct20): * Diplopia due to NPH improving, S/P  shunt. * CKD progressive, creatinine 1.9 eGFR 34. *  PTH 75, Ca 9.3. * HTN controlled, 110 - 120s/70s. No lightheadedness. * He is following up with radiologist for renal cell cancer surveillance. (S/P ablation of 2.6 cm left kidney mass).  Previous history (17Aug20): * 75 ounces of urine daily.  617 osm urine. Urine decreases when he drinks less. * CKD stable, creatinine 1.52, eGFR 33 by cystatin C. * HTN now controlled, average  / 70s. Still on amlodipine 5 mg qd. Now off dexamethasone.   Previous history (07Aug20): *  shunt placed for NPH. Now has double vision. Started on dexamethasone 5 days ago. * Creatinine increased to 1.86 (eGFR 33) on [id number]. U/A normal. * He is following up with radiologist for renal cell cancer surveillance. (S/P ablation of 2.6 cm left kidney mass). * SBP now 140s. He had previously been on amlodipine 7.5 and is now on amlodipine 5. BP at home 120s. * HE WAKES UP 4 - 6 TIMES A NIGHT AND URINATES 2 LITERS. He is following with Cristian Sarmiento (urologist). PVR 82. SG 1.014. SNa 142. Reportedly he urinates < 4 times daily. Denies increased thirst.   Previous history (26May20): * CKD stable, creatinine previously 1.49. He has not had labs in 5 months. * Diagnosed with PD. Following up with Dr. Nino. * MRI scheduled for renal cell cancer surveillance. *   Previous history (13Apr20): * Average BP at home 120s - 130s. No lightheadedness. Compliant. * CKD stable, creatinine 1.46. * No muscle aching or weakness on statin. * He is following up with radiologist for renal cell cancer surveillance.   Previous history (01Oct19): * 1 - 2 weeks ago he developed URI with slight diarrhea, which is now resolving. * CKD previously stable, creatinine 1.88. * HTN controlled. No lightheadedness. Compliant with medications.   Previous history (17Sep19): * Creatinine increased to 1.88 in [id number] and decreased to 1.66. * HTN borderline controlled. No lightheadedness. Compliant with medications. * He has been evaluated by Dr. Meek for monoclonal gammopathy.   Previous history (27Tov83): * Following up with Dr. Kwan s/p ablation of left renal cell carcinoma. * CKD previous stable, creatinine 1.59 in 6/27/19.. * HTN controlled. No lightheadedness. Compliant with medications. * he has been evaluated by Dr. Meek for monoclonal gammopathy.  Previous history (16May19): * Underwent ablation of left renal cell carcinoma. * CKD stable, creatinine 1.52. * HTN controlled.  - 130s at home.  No lightheadedness. Compliant with medications.    Previous history (03Apr19): * Scheduled for ablation for left renal cell carcinoma (2.6 cm). * CKD stable, creatinine 1.42. * HTN uncontrolled in office, SBP 120s - 130s average at home.  SBP with Dr. Huang.  No lightheadedness. Compliant with medications. Following low salt diet. * He believes amlodipine causing constipation and LE edema and would like to stop this.   Previous history (14Feb19): * Since 2014, his creatinine has ranged from 1.4 - 1.7. Most recently, his creatinine was 1.48 (GFR 44). He is unaware of proteinuria or hematuria. The patient denies exposure to chronic NSAIDs, PPIs, green smoothies, creatine, or herbal supplements. The patient denies a history of kidney stones or UTIs.  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 No evidence of myeloma.    Previous chart reviewed and summarized.   Options for clinical preventative services  Moderna 20Jan21, Booster;  Bivalent 2022  Influenza: September 2020, sep 2022  Pneumonia: given x 2 Shingles: shingrix  TDAP: Colonoscopy: 2018 PSA: by urology Dermatologist: advised to see yearly

## 2025-05-09 NOTE — HISTORY OF PRESENT ILLNESS
[Home] : at home, [unfilled] , at the time of the visit. [Telephone (audio)] : This telephonic visit was provided via audio only technology. [Verbal consent obtained from patient] : the patient, [unfilled] [FreeTextEntry1] : * eGFR 30 (avg. of CKD-EPIcr & CKD-EPIcys). Cr 2.19. * No renal colic. * Hypokalemia controlled. K 4.0. * BP controlled.  - 130s at home. White coat effect in office. No SOB with exertion. No CP. No lightheadedness (pre-syncope). Adherent to medications. * Following up urology for renal lesion.   Previous history (23Apr25): * eGFR 35 (avg. of CKD-EPIcr & CKD-EPIcys). 176 mg albuminuria. *  He has seen hematologist for monocloanl protein (IgG). * No renal colic. * BP controlled.  - 130s at home. White coat effect in office. No SOB with exertion. No CP. No lightheadedness (pre-syncope). Adherent to medications.* Hypokalemia controlled. * PTH 85.   Previous history (03Mar25): eGFR 30 (avg. of CKD-EPIcr & CKD-EPIcys). *  He has seen hematologist for monocloanl protein (IgG). * No renal colic. * BP controlled.  - 130s at home. White coat effect in office. No SOB with exertion. No CP. No lightheadedness (pre-syncope). Adherent to medications.  *   Previous history (26Feb25): * eGFR 35 (avg. of CKD-EPIcr & CKD-EPIcys). 207 mg albuminuria. * He has seen hematologist for monocloanl protein (IgG). * No renal colic. * BP controlled.  - 130s at home. White coat effect in office. No SOB with exertion. No CP. No lightheadedness (pre-syncope). Adherent to medications.   Previous history (09Pdd79): HTN borderline uncontrolled.  - 130s at home. White coat effect. No SOB with exertion. No CP. No lightheadedness (pre-syncope). Adherent to medications. * K normalized (4.2). CKD stable, creatinine 1.86, eGFR 35 by CKD-EPIcr. * He has seen hematologist for monocloanl protein (IgG). * No renal colic.   Previous history (13Sep24): * K 3.4. No history of hyperkalemia but he has been on strict low K diet. * eGFR 35 (avg. of CKD-EPIcr & CKD-EPIcys).   Previous history (09Sep24):  * Renal lesion being followed by urology and IR. * No abdominal discomfort.  * HTN uncontrolled. BP 110s - 120s at home. No SOB with exertion. No CP. No lightheadedness. Compliant with medications.* He has been taking torsemide 2.5 mg daily and wishes to stay on this dose.   Previous history (16Jul24):  * Renal lesion being followed by urology and IR. * Right sided discomfort on 4/8 and CT scan showed possible passed kidney stone on right that was in the bladder. Following up with urologist. No further colic. * HTN borderline uncontrolled. BP 120s - 130s at home. No SOB with exertion. No CP. No lightheadedness. Compliant with medications. Swelling was intolerable with amlodipine 10, which was reduced to 5. * No diuresis to torsemide 2.5.   Previous history (20May24): eGFR 38 (avg. of CKD-EPIcr & CKD-EPIcys). 207 mg albuminuria. * Renal lesion being followed by urology and IR. * BP controlled. BP 120s at home. No SOB with exertion. No CP. No lightheadedness. Compliant with medications. * Right sided discomfort on 4/8 and CT scan showed possible passed kidney stone on right that was in the bladder. Following up with urologist.   Previous history (09Mar24): BP controlled. BP 120s on average at home. White coat effect in office.No SOB with exertion. No CP. No lightheadedness. Compliant with medications.  * 400 mg proteinuria. eGFR 38 (avg. of CKD-EPIcr & CKD-EPIcys). * Right sided discomfort on 4/8 and CT scan showed possible passed kidney stone on right that was in the bladder. Following up with urologist.   Previous history (11Apr24): Following up with urologist. for stone. * Cr 1.77. * No flank pain now c/w kidney stone. * HTN controlled.   Previous history (09Apr24): Labs from 4/8/24 reviewed: creatinine1.77. BP controlled.  No SOB with exertion. No CP. No lightheadedness. Compliant with medications. * Right sided discomfort on 4/8 and CT scan showed possible passed kidney stone on right that was in the bladder.   Previous history (06Mar24): * CKD stable, creatinine 1.86. Feels well. * He is taking torsemide 0.5 3x a week. * S/P ablation of left renal mass. Being followed yearly. * BP controlled.  - 120s  at home.   Previous history (26Feb24): * BP controlled.  - 120s  at home. White coat effect in office. No lightheadedness. No CP/SOB. Compliant with medications.  * He is taking torsemide 0.5 3x a week. * S/P ablation of left renal mass. Being followed yearly. * 160 mg albuminuria. CKD stable, creatinine 1.86.   Previous history (08Dec23): Labs from Dr. Ye: Cr 1.86. 160 mg albuminuria. * BP controlled.  - 130s at home. White coat effect. No lightheadedness. No CP/SOB. Compliant with medications. * S/P ablation of left renal mass. Being followed yearly. * No further episodes of low temperatures. Baseline is 95 - 96. No chills. * He is taking torsemide 0.5 5x a week.   Previous history (18Sep23): eGFR 37 (CKD-EPIcr & CKD-EPIcys avg). Urine albumin 58. * BP controlled.  - 120s at home. No lightheadedness. No CP/SOB. Compliant with medications. * S/P ablation of left renal mass. Being followed yearly. * 5x nocturia. Following up with urology. NO polyuria during the daytime.  * No further episodes of low temperatures. Baseline is 95 - 96. No chills.   Previous history (41Tjs41): * Went to ER for low temp of unclear cause. No signs of sepsis. Now normalized. * eGFR 34. 58 * HGA1c controlled. * S/P ablation of left renal mass. Being followed yearly. * 5x nocturia. Following up with urology. NO polyuria during the daytime.   Previous history (12May23): eGFR 41 based on cystatin C of 1.56 (EK eGFRcys equation, [facility] 2023). * HTN controlled. BP 110s at home. No lightheadedness. No CP/SOB. Compliant with medications.  * S/P ablation of left renal mass. 1.1 cm renal mass in right kidney being followed.   Previous history (11Apr23): eGFR 39 based on cystatin C of 1.65 (EK eGFRcys equation, [facility] 2023).* HTN controlled. BP 110s at home. No lightheadedness. No CP/SOB. Compliant with medications. * S/P ablation of 2.6 cm left renal mass. He is following up with radiologist fo surveillance.   Previous history (11Jan23): * CKD stable, creatinine 1.83 in October 2022. * HTN controlled. BP 110s at home. No lightheadedness. No CP/SOB. Compliant with medications. * S/P ablation of 2.6 cm left renal mass. He is following up with radiologist fo surveillance.   Previous history (09Nov22): * CKD stable, creatinine 1.83 on 10/22 with Dr. Ye. ** HTN controlled. BP 110s at home. No lightheadedness. No CP/SOB. Compliant with medications. * S/P ablation of 2.6 cm left renal mass. He is following up with radiologist fo surveillance.   Previous history (18Aug22): * CKD stable, eGFR 32.5. * HTN controlled.  - 130s at home. No lightheadedness. No CP/SOB. Compliant with medications. * He is following up with radiologist for renal cell cancer surveillance. (S/P ablation of 2.6 cm left kidney mass).   Previous history (02Aug22): * HTN controlled.  - 120s at home. No lightheadedness. No CP/SOB. Compliant with medications. * CKD stable, creatinine 1.86. * He is following up with radiologist for renal cell cancer surveillance. (S/P ablation of 2.6 cm left kidney mass).  Previous history (01Jun22): * Covid 1 month ago treated with renal-dose Paxlovid. No symptoms currently. * * HTN controlled.  - 120s at home. No lightheadedness. No CP/SOB. Compliant with medications. * CKD stable, creatinine 1.86.  * He is following up with radiologist for renal cell cancer surveillance. (S/P ablation of 2.6 cm left kidney mass).  Previous history (18Mar22): * HTN controlled.  - 120s at home. No lightheadedness. Compliant with medications. * He is following up with radiologist for renal cell cancer surveillance. (S/P ablation of 2.6 cm left kidney mass). * CKD stable, creatinine 2.02.   Previous history (50Bwy49): He is following up with radiologist for renal cell cancer surveillance. (S/P ablation of 2.6 cm left kidney mass).  He  * HTN controlled. No lightheadedness.  - 120s at home. Compliant with medications. * Labs from Dr. Ye reviewed. 11/15: Creatinine 2.05.   Previous history (96Rqv64): * CKD stable, creatinine 1.94 on 8/31. *  HTN controlled.  - 120s at home. No lightheadedness. Compliant with medications. * He is following up with radiologist for renal cell cancer surveillance. (S/P ablation of 2.6 cm left kidney mass).  Previous history (89Agp35): * CKD stable, creatinine 2.18. * * HTN controlled. No lightheadedness. Compliant with medications.  * He is following up with radiologist for renal cell cancer surveillance. (S/P ablation of 2.6 cm left kidney mass).  Previous history (66Yvb46): * HTN controlled.  - 120s at home. No lightheadedness. Compliant with medications. * CKD stable.  * He is following up with radiologist for renal cell cancer surveillance. (S/P ablation of 2.6 cm left kidney mass).  Previous history (09Jun21): * HTN uncontrolled.  - 150s at home. No lightheadedness. Compliant with medications. * Stopped farxiga due to arm erythema, which has now resolved. * He is following up with radiologist for renal cell cancer surveillance. (S/P ablation of 2.6 cm left kidney mass). * CKD stable, creatinine 1.91.   Previous history (33Zhf56): * CKD stable, creatinine 1.9. * He is following up with radiologist for renal cell cancer surveillance. (S/P ablation of 2.6 cm left kidney mass). * HTN uncontrolled at home, 120s - 150s. Unable to tolerate clonidine.  * Started farxiga once and developed bilateral arm redness without other pain. This has now resolving and he is considering starting again when it completely resolves.   Previous history (06May21): * CKD stable, creatinine 1.9 (eGFR 32). 109 mg albuminuria. * He is following up with radiologist for renal cell cancer surveillance. (S/P ablation of 2.6 cm left kidney mass). * HTN uncontrolled at home, 120s - 150s. Unable to tolerate clonidine.   Previous history (29Mar21): * CKD stable, creatinine 1.9.  * He is following up with radiologist for renal cell cancer surveillance. (S/P ablation of 2.6 cm left kidney mass). * HTN previously borderline controlled.  with Dr. Huang. No lightheadedness. BP 110s - 130 at home.   Previous history (20Jan21): * Moderna vaccine given yesterday. * CKD stable, creatinine 1.94 (eGFR 31). * He is following up with radiologist for renal cell cancer surveillance. (S/P ablation of 2.6 cm left kidney mass).  * HTN controlled. No lightheadedness.   Previous history (28Dec20): * CKD progressive, creatinine 1.94 (eGFR 31). * He is following up with radiologist for renal cell cancer surveillance. (S/P ablation of 2.6 cm left kidney mass). * 200 - 300 mg proteinuria. * Losartan decreased to 50 and amlodipine increased to 10 by Dr. Huang [id number]. BP 110s/70s. No lightheadedness.  * Diplopia due to NPH improving, S/P  shunt. * Anemia improved.   Previous history (21Oct20): * Diplopia due to NPH improving, S/P  shunt. * CKD progressive, creatinine 1.9 eGFR 34. *  PTH 75, Ca 9.3. * HTN controlled, 110 - 120s/70s. No lightheadedness. * He is following up with radiologist for renal cell cancer surveillance. (S/P ablation of 2.6 cm left kidney mass).  Previous history (17Aug20): * 75 ounces of urine daily.  617 osm urine. Urine decreases when he drinks less. * CKD stable, creatinine 1.52, eGFR 33 by cystatin C. * HTN now controlled, average  / 70s. Still on amlodipine 5 mg qd. Now off dexamethasone.   Previous history (07Aug20): *  shunt placed for NPH. Now has double vision. Started on dexamethasone 5 days ago. * Creatinine increased to 1.86 (eGFR 33) on [id number]. U/A normal. * He is following up with radiologist for renal cell cancer surveillance. (S/P ablation of 2.6 cm left kidney mass). * SBP now 140s. He had previously been on amlodipine 7.5 and is now on amlodipine 5. BP at home 120s. * HE WAKES UP 4 - 6 TIMES A NIGHT AND URINATES 2 LITERS. He is following with Cristian Sarmiento (urologist). PVR 82. SG 1.014. SNa 142. Reportedly he urinates < 4 times daily. Denies increased thirst.   Previous history (26May20): * CKD stable, creatinine previously 1.49. He has not had labs in 5 months. * Diagnosed with PD. Following up with Dr. Nino. * MRI scheduled for renal cell cancer surveillance. *   Previous history (13Apr20): * Average BP at home 120s - 130s. No lightheadedness. Compliant. * CKD stable, creatinine 1.46. * No muscle aching or weakness on statin. * He is following up with radiologist for renal cell cancer surveillance.   Previous history (01Oct19): * 1 - 2 weeks ago he developed URI with slight diarrhea, which is now resolving. * CKD previously stable, creatinine 1.88. * HTN controlled. No lightheadedness. Compliant with medications.   Previous history (17Sep19): * Creatinine increased to 1.88 in [id number] and decreased to 1.66. * HTN borderline controlled. No lightheadedness. Compliant with medications. * He has been evaluated by Dr. Meek for monoclonal gammopathy.   Previous history (16Aug19): * Following up with Dr. Kwan s/p ablation of left renal cell carcinoma. * CKD previous stable, creatinine 1.59 in 6/27/19.. * HTN controlled. No lightheadedness. Compliant with medications. * he has been evaluated by Dr. Meek for monoclonal gammopathy.  Previous history (16May19): * Underwent ablation of left renal cell carcinoma. * CKD stable, creatinine 1.52. * HTN controlled.  - 130s at home.  No lightheadedness. Compliant with medications.    Previous history (03Apr19): * Scheduled for ablation for left renal cell carcinoma (2.6 cm). * CKD stable, creatinine 1.42. * HTN uncontrolled in office, SBP 120s - 130s average at home.  SBP with Dr. Huang.  No lightheadedness. Compliant with medications. Following low salt diet. * He believes amlodipine causing constipation and LE edema and would like to stop this.   Previous history (56Zec79): * Since 2014, his creatinine has ranged from 1.4 - 1.7. Most recently, his creatinine was 1.48 (GFR 44). He is unaware of proteinuria or hematuria. The patient denies exposure to chronic NSAIDs, PPIs, green smoothies, creatine, or herbal supplements. The patient denies a history of kidney stones or UTIs.  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 No evidence of myeloma.    Previous chart reviewed and summarized.   Options for clinical preventative services  Moderna 20Jan21, Booster;  Bivalent 2022  Influenza: September 2020, sep 2022  Pneumonia: given x 2 Shingles: shingrix  TDAP: Colonoscopy: 2018 PSA: by urology Dermatologist: advised to see yearly

## 2025-05-09 NOTE — ASSESSMENT
[FreeTextEntry1] : -- # HTN controlled. White coat effect. * Continue torsemide 2.5 mg three times a week. (This is the dose he has been taking.) * Cont torsemide, losartan, amlodipine. * A counseling information sheet on blood pressure and staying healthy has been given (which they have been instructed to read). * The patient has been counseled to check their BP at home with an automatic arm cuff, write down the readings, and reach me directly on the phone immediately if they are persistently > 180 systolic or if SBP is less than 100 or if lightheadedness develops. They were counseled to bring in all blood pressure readings and medications next visit. * The patient has been counseled that regular office follow-up (at least every 2 mo for now) is important for monitoring and for their health, and that it is their responsibility to make follow up appointments. * The patient also has been counseled that they must never stop or change any medications without discussing this with me (or another physician).  # CKD stage 3 due to aging/HTN with 124 mg albuminuria. * Recheck labs next visit.  * Not on SGLT2i due to rash. * Therapies for kidney disease: blood pressure control; proteinuria reduction with ARB/ACEi; other evidence-based therapies recommended including exercise, a plant-based lower oxalate diet, and 400 mcg folic acid daily * Cardiovascular disease prevention: counseling on healthy diet, physical activity, weight loss, alcohol limitation, blood pressure control; statin therapy; cardiology evaluation/followup advised * A counseling information sheet on CKD has been given (which they have been instructed to read). * The patient has been counseled that chronic kidney disease is a significant condition and regular office follow-up with me (at least every 2 months for now) is important for monitoring and their health, and that it is their responsibility to make a follow-up appointment. * The patient has been counseled never to stop taking their medications without discussing it with me or another doctor. * The patient has been counseled on avoiding NSAIDs. * The patient has been counseled on risk of worsening kidney function and instructed to immediately call and speak with me and go immediately to ER with any severe symptoms, nausea, vomiting, diarrhea, chest pain, or shortness of breath.  # Less than 3 cm kidney cancer. * Follow up with radiology/urology.  # Hypokalemia. * Improved.  # Urolithiasis. * High fluid intake.  # Monoclonal protein. * Advised to follow up with heme.  # Hyperpara. * Follow up PTH. Pre-Procedure Text: After consent was obtained, the treatment areas were cleansed and treated using the parameters mentioned above.

## 2025-05-22 NOTE — HISTORY OF PRESENT ILLNESS
Detail Level: Detailed Detail Level: Generalized [FreeTextEntry1] : 87 year old Male with PMHx of HLD, HTN, aortic regurgitation, CKD, COPD, pulmonary hypertension,  hypothyroidism, anemia, and venous reflux presents today for follow up and echocardiogram.  Since last visit 11/11/2024, he has continued to follow up with his urologist Dr. Ema Wiley regarding right renal mass. He is due for repeat MRI in April. Overall he remains well and has no acute concerns. He continues to be active by walking. Most days he walks 12 blocks but states that he can walk as long as 1 hour. He walks up 5 flights of stairs in his building daily. He has some knee pain with stairs but no chest pain or dyspnea. He measures his blood pressure at home which ranges 110-140/70s. He has been eating less salt. He feels off balanced while walking, this is rare. Denies falls or syncope.  Patient denies any chest pain or shortness of breath at rest, palpitations, orthopnea, PND, dizziness, falls, syncope, other neuro focal deficits. ================================================================= Previous workup: Labs (2/2025):  HDL 46  LDL 75 K 4 Cr 2.36 BUN 44 GFR 26 TSH 1.88 H/H 12.7/39.2 A1c 6%  Duplex venous US (7/2024):  1. Right: no evidence of right lower extremity deep or superficial venous thrombosis. 2. No evidence of venous reflux in the right lower extremity. 3. Left: no evidence of left lower extremity deep venous thrombosis. 4. Left Great Saphenous Vein (Calf): Venous reflux is noted.  Echo (12/2023): 1. Left ventricular cavity is normal. Left ventricular systolic function is normal with an ejection fraction visually estimated at 55 to 60 %. There are no regional wall motion abnormalities seen. 2. There is mild (grade 1) left ventricular diastolic dysfunction, with normal filling pressure. 3. Normal right ventricular cavity size, wall thickness, and systolic function. 4. The right atrium is dilated in size. 5. Mild aortic regurgitation. 6. Mild left ventricular hypertrophy. 7. Mild pulmonary hypertension.  Carotid US (8/2022): bilateral proximal ICA 1-19% stenosis, angegrade flow bilaterally of vertebral arteries, no significant atherosclerosis of subclavian arteries bilaterally  Stress echo (2018): no chest discomfort, no significant EKG changes, normal augmentation of wall motion in all left ventricular segments, normal exercise echocardiogram.  Detail Level: Zone

## 2025-07-02 NOTE — HISTORY OF PRESENT ILLNESS
[FreeTextEntry1] : Dear Dr. Guzman (nephrologist),  Thank you so much for the referral to help care for your patient.  Chief Complaint: 1.5cm right renal mass (stable) Date of first visit: 7/17/24  Barry P. Dubin is a 88-year-old gentleman with PMH of hypothyroidism, chronic anemia, CKD stage III, hx of RCC, HTN, HLD, depression, glaucoma, possible nephrolithiasis, who presents for 1.5cm right renal mass. Brother with Kidney Cancer. the interval imaging May 2024 to August 2024 (stable).   Previously patient of Dr. Kwan, renal US ordered by nephrology for CKD showed 2.6cm left renal mass (2019), s/p image guided microwave ablation by Dr. Brandyn Keating on 4/15/19. 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  Allergies: Farxiga and sertraline  PSA Hx: none  MRI Hx: MRI Abdomen at [facility] on 04/09/2025 - 1. Since July 7, 2024, unchanged right upper renal pole lesion, slightly increased since 2023, in keeping with cortical renal neoplasm, probably papillary renal cell carcinoma. 2. No suspicious finding in left renal ablation bed.  MRI Abdomen at [facility] on 01/07/2025 - 1. Indeterminate, internal signal changes of the post ablation cavity left upper lobe pole. Cannot totally exclude a low level of enhancement along the inferior aspect of the lesion. Recommend continued follow-up 2. Stable 1.5 cm mass upper pole right kidney, indeterminate. Recommend continued follow-up.  MRI August 1st 2024. The renal mass is stable over 3 month interval.  MRI Abdomen at [facility] on 05/02/2024. - 1. Since 4/8/2024, no significant change in appearance of ablation cavity along medial aspect of upper pole of left kidney. No evidence of recurrent cancer at this site. 2. A 1.5 cm mass arising from the upper pole of the right kidney is suspicious for an additional site of papillary renal cell carcinoma. 3. There are again a few hemorrhagic cysts in each kidney in additional multiple simple cysts. 4. Small ascites. 5. No change small right adrenal nodule, probably a lipid poor adenoma.  04/16/2025 IPSS15 QOL 3 VALERIE 5  1/15/25 IPSS10 QOL 3 VALERIE 13 max flow 5.4 ml/s, ave 3.0 ml/s, VV 20.9cc nondiagnostic, PVR 14cc  The patient denies fevers, chills, nausea and or vomiting and no unexplained weight loss.  All pertinent laboratory, films and physician notes were reviewed. Questionnaire results were discussed with patient.

## 2025-07-02 NOTE — ASSESSMENT
[FreeTextEntry1] : 88-year-old gentleman with PMH of hypothyroidism, chronic anemia, CKD stage III, hx of RCC, HTN, HLD, depression, glaucoma, possible nephrolithiasis, who presents for follow up for 1.5 right renal mass  1. 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 - recent MRi Cannot totally exclude a low level of enhancement along the inferior aspect of the post ablation cavity in left upper lobe pole kidney, we will repeat MRI in 3 months - MRI 4/25 unchanged since july 7/24 - US in 6 months x 3 and MRI in 2 years  2. Nocturia  - voiding dairy June 2025 50% nocturnal polyuria  - consider MAUREEN as cause- per patient had MAUREEN ruled out by pulmonologist in past - alfuzosin stopped due to no change in symptoms - MAUREEN is primary cause most likely  RTC 6 months for US  Thank you very much for allowing me to assist in the care of this patient. Please do not hesitate to contact me with any additional questions or concerns.     Sincerely,     George Wiley D.O. Professor of Urology and Radiology  of Urology at [facility] Director for Prostate Cancer [address] [place] [address] Phone: [phone]

## 2025-07-02 NOTE — HISTORY OF PRESENT ILLNESS
[FreeTextEntry1] : Dear Dr. Guzman (nephrologist),  Thank you so much for the referral to help care for your patient.  Chief Complaint: 1.5cm right renal mass (stable) Date of first visit: 7/17/24  Barry P. Dubin is a 88-year-old gentleman with PMH of hypothyroidism, chronic anemia, CKD stage III, hx of RCC, HTN, HLD, depression, glaucoma, possible nephrolithiasis, who presents for 1.5cm right renal mass. Brother with Kidney Cancer. the interval imaging May 2024 to August 2024 (stable).   Previously patient of Dr. Kwan, renal US ordered by nephrology for CKD showed 2.6cm left renal mass (2019), s/p image guided microwave ablation by Dr. Brandyn Keating on 4/15/19. 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  Allergies: Farxiga and sertraline  PSA Hx: none  MRI Hx: MRI Abdomen at [facility] on 04/09/2025 - 1. Since July 7, 2024, unchanged right upper renal pole lesion, slightly increased since 2023, in keeping with cortical renal neoplasm, probably papillary renal cell carcinoma. 2. No suspicious finding in left renal ablation bed.  MRI Abdomen at [facility] on 01/07/2025 - 1. Indeterminate, internal signal changes of the post ablation cavity left upper lobe pole. Cannot totally exclude a low level of enhancement along the inferior aspect of the lesion. Recommend continued follow-up 2. Stable 1.5 cm mass upper pole right kidney, indeterminate. Recommend continued follow-up.  MRI August 1st 2024. The renal mass is stable over 3 month interval.  MRI Abdomen at [facility] on 05/02/2024. - 1. Since 4/8/2024, no significant change in appearance of ablation cavity along medial aspect of upper pole of left kidney. No evidence of recurrent cancer at this site. 2. A 1.5 cm mass arising from the upper pole of the right kidney is suspicious for an additional site of papillary renal cell carcinoma. 3. There are again a few hemorrhagic cysts in each kidney in additional multiple simple cysts. 4. Small ascites. 5. No change small right adrenal nodule, probably a lipid poor adenoma.  04/16/2025 IPSS15 QOL 3 VALERIE 5  1/15/25 IPSS10 QOL 3 VALERIE 13 max flow 5.4 ml/s, ave 3.0 ml/s, VV 20.9cc nondiagnostic, PVR 14cc  The patient denies fevers, chills, nausea and or vomiting and no unexplained weight loss.  All pertinent laboratory, films and physician notes were reviewed. Questionnaire results were discussed with patient.

## 2025-07-02 NOTE — ASSESSMENT
[FreeTextEntry1] : 88-year-old gentleman with PMH of hypothyroidism, chronic anemia, CKD stage III, hx of RCC, HTN, HLD, depression, glaucoma, possible nephrolithiasis, who presents for follow up for 1.5 right renal mass  1. 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 - recent MRi Cannot totally exclude a low level of enhancement along the inferior aspect of the post ablation cavity in left upper lobe pole kidney, we will repeat MRI in 3 months - MRI 4/25 unchanged since july 7/24 - US in 6 months x 3 and MRI in 2 years  2. Nocturia  - voiding dairy June 2025 50% nocturnal polyuria  - consider MAUREEN as cause- per patient had MAUREEN ruled out by pulmonologist in past - alfuzosin stopped due to no change in symptoms - MAUREEN is primary cause most likely  RTC 6 months for US  Thank you very much for allowing me to assist in the care of this patient. Please do not hesitate to contact me with any additional questions or concerns.     Sincerely,     George Wiley D.O. Professor of Urology and Radiology  of Urology at [facility] Director for Prostate Cancer [address] [place] [address] Phone: [phone]

## 2025-07-10 NOTE — REASON FOR VISIT
[Follow-Up: _____] : a [unfilled] follow-up visit [FreeTextEntry1] : s/p R frontal VPS placement [FreeTextEntry2] : 6/25/202

## 2025-07-10 NOTE — HISTORY OF PRESENT ILLNESS
[FreeTextEntry1] : 88 year old man with history of worsening gait who presented to [facility] ED. He also reported urinary urgency/stress incontinence and mild cognitive impairment. Brain imaging indicates ventriculomegaly.  He had subjective improvement in symptoms after high volume spinal tap and underwent  shunt placement on 6/25/2020 with Dr. Mendoza.  Postoperatively, he had diplopia and VI cranial nerve palsy, which has now resolved.  His  shunt was adjusted to 6 at prior visit due to worsening unsteady gait.  At prior visit, we reviewed MRI brain done without contrast on 6/1/21 which was stable. His  shunt was maintained at 6.  Follow up MRI brain from 11/19/21 showed a stable ventricular size. VPS set at 6.0 His gait remained the same. Denied falls. Denied headaches, dizziness, weakness. A repeat 6 month MRI brain (June 2022) was recommended.  4/29/22, patient returned to check at VPS site, states soreness around the lateral posterior site for the past 2 weeks. He denies gait issues, falls, headaches, dizziness, focal weakness. Denies diplopia. Denies abd pain. He is scheduled for his f/u MRI brain on 6/6/22. Shunt checked and set at 6.0 Pain can be due to scar tissue. Recommended he RTC after MRI to review.  6/6/2022 visit Pt presents with completed MRI. He endorses overall doing well. Denies headaches, gait issues, falls, dizziness, diplopia, new/ worsening focal neuro deficits. States his wife is having emphysema exacerbation after recent COVID and very stressed today.  6/6/23: Shunt setting checked at 6.0 today. MRI today showed stable ventricular size. Plan was made to repeat MRI brain wo in one year and follow up after.  2/27/24: Returns today due to worsening imbalance and walking. He states he "veers to the right" when walking. Denies falls. Denies headaches.  3/1/24: Returns to review MRI brain without contrast done on 2/28/24, which is stable. VPS = 6 He also reports worsening low back pain. He states he thinks he pulled a muscle in his back. Was unable to sleep last night due to pain. Denies radiating pain, numbness/tingling, weakness.  4/8/24 Comes in today with 2 months of progressive right sided abdominal pain and swelling and concerns about shunt catheter. He has new xrays to review. He is having a bowel movement every 3 days which is normal for him. VPS checked and at 6. Reviewed MRI brain w/o performed on 2/28/2024 and stable. Xray shunt series without acute findings. Do not recommend changing  shunt given history of subdural hygromas on prior MRIs after shunt adjustment. Will maintain  shunt at 6 In regards to the RLQ mass - recommended he be evaluated in the ER for progressive pain and palpable mass.  4/18/2025: patient is doing well. denies headaches, balance issues, dizziness, or bowel/bladder problems. Certas confirmed @ 6. Reviewed MRI performed on 4/9/2025, ventricular size stable. MRI brain w/o due in 1 year (April 2026).   TODAY:

## 2025-07-15 NOTE — HISTORY OF PRESENT ILLNESS
[FreeTextEntry1] : 88 year old man with history of worsening gait who presented to [facility] ED. He also reported urinary urgency/stress incontinence and mild cognitive impairment. Brain imaging indicates ventriculomegaly.  He had subjective improvement in symptoms after high volume spinal tap and underwent  shunt placement on 6/25/2020 with Dr. Mendoza.  Postoperatively, he had diplopia and VI cranial nerve palsy, which has now resolved.  His  shunt was adjusted to 6 at prior visit due to worsening unsteady gait.  At prior visit, we reviewed MRI brain done without contrast on 6/1/21 which was stable. His  shunt was maintained at 6.  Follow up MRI brain from 11/19/21 showed a stable ventricular size. VPS set at 6.0 His gait remained the same. Denied falls. Denied headaches, dizziness, weakness. A repeat 6 month MRI brain (June 2022) was recommended.  4/29/22, patient returned to check at VPS site, states soreness around the lateral posterior site for the past 2 weeks. He denies gait issues, falls, headaches, dizziness, focal weakness. Denies diplopia. Denies abd pain. He is scheduled for his f/u MRI brain on 6/6/22. Shunt checked and set at 6.0 Pain can be due to scar tissue. Recommended he RTC after MRI to review.  6/6/2022 visit Pt presents with completed MRI. He endorses overall doing well. Denies headaches, gait issues, falls, dizziness, diplopia, new/ worsening focal neuro deficits. States his wife is having emphysema exacerbation after recent COVID and very stressed today.  6/6/23: Shunt setting checked at 6.0 today. MRI today showed stable ventricular size. Plan was made to repeat MRI brain wo in one year and follow up after.  2/27/24: Returns today due to worsening imbalance and walking. He states he "veers to the right" when walking. Denies falls. Denies headaches.  3/1/24: Returns to review MRI brain without contrast done on 2/28/24, which is stable. VPS = 6 He also reports worsening low back pain. He states he thinks he pulled a muscle in his back. Was unable to sleep last night due to pain. Denies radiating pain, numbness/tingling, weakness.  4/8/24 Comes in today with 2 months of progressive right sided abdominal pain and swelling and concerns about shunt catheter. He has new xrays to review. He is having a bowel movement every 3 days which is normal for him. VPS checked and at 6. Reviewed MRI brain w/o performed on 2/28/2024 and stable. Xray shunt series without acute findings. Do not recommend changing  shunt given history of subdural hygromas on prior MRIs after shunt adjustment. Will maintain  shunt at 6 In regards to the RLQ mass - recommended he be evaluated in the ER for progressive pain and palpable mass.  4/18/2025: patient is doing well. denies headaches, balance issues, dizziness, or bowel/bladder problems. Certas confirmed @ 6. Reviewed MRI performed on 4/9/2025, ventricular size stable. MRI brain w/o due in 1 year (April 2026).   TODAY: patient noticed worsening balance issues beginning approximately 1 month ago.

## 2025-07-15 NOTE — DISCUSSION/SUMMARY
[FreeTextEntry1] : The patient does moderate physical activity without difficulty.  Diet was advised for constipation.  Declines tetanus.  He is up-to-date on other vaccines.  His blood pressure is controlled at home.  He is following up for his renal cell carcinoma.  The patient will contact me what the names of his other providers. He is not having any cognitive impairment. He has no significant current or past depression. He is fully functional and there are no safety issues. He will be screened for eye examination,  and Immunizations over the next 10 years. The patient was furnished with personalized health advice and does not need referral to health education or preventative counseling services. Patient does not need advanced care planning services.  Laboratory testing was done. [EKG obtained to assist in diagnosis and management of assessed problem(s)] : EKG obtained to assist in diagnosis and management of assessed problem(s)

## 2025-07-15 NOTE — PHYSICAL EXAM
[Well Developed] : well developed [Well Nourished] : well nourished [No Acute Distress] : no acute distress [No Carotid Bruit] : no carotid bruit [Normal S1, S2] : normal S1, S2 [Clear Lung Fields] : clear lung fields [Good Air Entry] : good air entry [No Respiratory Distress] : no respiratory distress  [Edema ___] : edema [unfilled] [Venous varicosities] : venous varicosities [Moves all extremities] : moves all extremities [No Focal Deficits] : no focal deficits [Normal Speech] : normal speech [Alert and Oriented] : alert and oriented [Normal memory] : normal memory [de-identified] : Hearing aids [de-identified] : 1/4 diastolic [de-identified] : rectal at uro, no hernia, normal testicles

## 2025-07-15 NOTE — REVIEW OF SYSTEMS
[Hearing Loss] : hearing loss [Lower Ext Edema] : lower extremity edema [Constipation] : constipation [Depression] : depression [Negative] : Heme/Lymph

## 2025-07-15 NOTE — PHYSICAL EXAM
[Well Developed] : well developed [Well Nourished] : well nourished [No Acute Distress] : no acute distress [No Carotid Bruit] : no carotid bruit [Normal S1, S2] : normal S1, S2 [Clear Lung Fields] : clear lung fields [Good Air Entry] : good air entry [No Respiratory Distress] : no respiratory distress  [Edema ___] : edema [unfilled] [Venous varicosities] : venous varicosities [Moves all extremities] : moves all extremities [No Focal Deficits] : no focal deficits [Normal Speech] : normal speech [Alert and Oriented] : alert and oriented [Normal memory] : normal memory [de-identified] : Hearing aids [de-identified] : 1/4 diastolic [de-identified] : rectal at uro, no hernia, normal testicles

## 2025-07-15 NOTE — PHYSICAL EXAM
[General Appearance - Alert] : alert [General Appearance - In No Acute Distress] : in no acute distress [Oriented To Time, Place, And Person] : oriented to person, place, and time [Motor Tone] : muscle tone was normal in all four extremities [Motor Strength] : muscle strength was normal in all four extremities

## 2025-07-15 NOTE — HISTORY OF PRESENT ILLNESS
[FreeTextEntry1] : walk 14  blocks. constipation chronic. for sleep study. eye recent. for covid in sept. took rsv. hearing aids checked.

## 2025-07-15 NOTE — ASSESSMENT
[FreeTextEntry1] : Patient non-focal, seems to be doing okay. Subjectively, his balance is slightly worse. Recommend physical therapy. Certas between 6-7, reset to be at 6. Patient developed subdural hygromas in the past so I'm not incline to put his shunt valve lower than 6.0   - PT referral provided - RTC in 6 months  Patient and patient's family verbalize agreement and understanding with plan of care.  I, Dr. Dl Mendoza, personally performed the evaluation and management (E/M) services for this established patient who presents today with (a) new problem(s)/exacerbation of (an) existing condition(s). That E/M includes conducting the clinically appropriate interval history &/or exam, assessing all new/exacerbated conditions, and establishing a new plan of care. Today, my LORIN, Kirill Collins, was here to observe my evaluation and management service for this new problem/exacerbated condition and follow the plan of care established by me going forward.

## 2025-07-15 NOTE — HISTORY OF PRESENT ILLNESS
[FreeTextEntry1] : 88 year old man with history of worsening gait who presented to [facility] ED. He also reported urinary urgency/stress incontinence and mild cognitive impairment. Brain imaging indicates ventriculomegaly.  He had subjective improvement in symptoms after high volume spinal tap and underwent  shunt placement on 6/25/2020 with Dr. Mendoza.  Postoperatively, he had diplopia and VI cranial nerve palsy, which has now resolved.  His  shunt was adjusted to 6 at prior visit due to worsening unsteady gait.  At prior visit, we reviewed MRI brain done without contrast on 6/1/21 which was stable. His  shunt was maintained at 6.  Follow up MRI brain from 11/19/21 showed a stable ventricular size. VPS set at 6.0 His gait remained the same. Denied falls. Denied headaches, dizziness, weakness. A repeat 6 month MRI brain (June 2022) was recommended.  4/29/22, patient returned to check at VPS site, states soreness around the lateral posterior site for the past 2 weeks. He denies gait issues, falls, headaches, dizziness, focal weakness. Denies diplopia. Denies abd pain. He is scheduled for his f/u MRI brain on 6/6/22. Shunt checked and set at 6.0 Pain can be due to scar tissue. Recommended he RTC after MRI to review.  6/6/2022 visit Pt presents with completed MRI. He endorses overall doing well. Denies headaches, gait issues, falls, dizziness, diplopia, new/ worsening focal neuro deficits. States his wife is having emphysema exacerbation after recent COVID and very stressed today.  6/6/23: Shunt setting checked at 6.0 today. MRI today showed stable ventricular size. Plan was made to repeat MRI brain wo in one year and follow up after.  2/27/24: Returns today due to worsening imbalance and walking. He states he "veers to the right" when walking. Denies falls. Denies headaches.  3/1/24: Returns to review MRI brain without contrast done on 2/28/24, which is stable. VPS = 6 He also reports worsening low back pain. He states he thinks he pulled a muscle in his back. Was unable to sleep last night due to pain. Denies radiating pain, numbness/tingling, weakness.  4/8/24 Comes in today with 2 months of progressive right sided abdominal pain and swelling and concerns about shunt catheter. He has new xrays to review. He is having a bowel movement every 3 days which is normal for him. VPS checked and at 6. Reviewed MRI brain w/o performed on 2/28/2024 and stable. Xray shunt series without acute findings. Do not recommend changing  shunt given history of subdural hygromas on prior MRIs after shunt adjustment. Will maintain  shunt at 6 In regards to the RLQ mass - recommended he be evaluated in the ER for progressive pain and palpable mass.  4/18/2025: patient is doing well. denies headaches, balance issues, dizziness, or bowel/bladder problems. Certas confirmed @ 6. Reviewed MRI performed on 4/9/2025, ventricular size stable. MRI brain w/o due in 1 year (April 2026).   TODAY: patient noticed worsening balance issues beginning approximately 1 month ago.

## 2025-07-17 NOTE — HISTORY OF PRESENT ILLNESS
[FreeTextEntry1] : * BP controlled. BP 120s at home. White coat effect in office. No SOB with exertion. No CP. No lightheadedness (pre-syncope). Adherent to medications. * Following up with Dr. Wiley for nocturia. On flomax.  * No renal colic. * Cr 1.97, eGFR 32 by CKD-EPIcr. * K 4.4. Hypokalemia controlled.   Previous history (09May25): * eGFR 30 (avg. of CKD-EPIcr & CKD-EPIcys). Cr 2.19. * No renal colic. * Hypokalemia controlled. K 4.0. * BP controlled.  - 130s at home. White coat effect in office. No SOB with exertion. No CP. No lightheadedness (pre-syncope). Adherent to medications.*  Previous history (23Apr25): * eGFR 35 (avg. of CKD-EPIcr & CKD-EPIcys). 176 mg albuminuria. *  He has seen hematologist for monocloanl protein (IgG). * No renal colic. * BP controlled.  - 130s at home. White coat effect in office. No SOB with exertion. No CP. No lightheadedness (pre-syncope). Adherent to medications.* Hypokalemia controlled. * PTH 85.   Previous history (03Mar25): eGFR 30 (avg. of CKD-EPIcr & CKD-EPIcys). *  He has seen hematologist for monocloanl protein (IgG). * No renal colic. * BP controlled.  - 130s at home. White coat effect in office. No SOB with exertion. No CP. No lightheadedness (pre-syncope). Adherent to medications.  *   Previous history (26Feb25): * eGFR 35 (avg. of CKD-EPIcr & CKD-EPIcys). 207 mg albuminuria. * He has seen hematologist for monocloanl protein (IgG). * No renal colic. * BP controlled.  - 130s at home. White coat effect in office. No SOB with exertion. No CP. No lightheadedness (pre-syncope). Adherent to medications.   Previous history (26Dec24): HTN borderline uncontrolled.  - 130s at home. White coat effect. No SOB with exertion. No CP. No lightheadedness (pre-syncope). Adherent to medications. * K normalized (4.2). CKD stable, creatinine 1.86, eGFR 35 by CKD-EPIcr. * He has seen hematologist for monocloanl protein (IgG). * No renal colic.   Previous history (13Sep24): * K 3.4. No history of hyperkalemia but he has been on strict low K diet. * eGFR 35 (avg. of CKD-EPIcr & CKD-EPIcys).   Previous history (09Sep24):  * Renal lesion being followed by urology and IR. * No abdominal discomfort.  * HTN uncontrolled. BP 110s - 120s at home. No SOB with exertion. No CP. No lightheadedness. Compliant with medications.* He has been taking torsemide 2.5 mg daily and wishes to stay on this dose.   Previous history (16Jul24):  * Renal lesion being followed by urology and IR. * Right sided discomfort on 4/8 and CT scan showed possible passed kidney stone on right that was in the bladder. Following up with urologist. No further colic. * HTN borderline uncontrolled. BP 120s - 130s at home. No SOB with exertion. No CP. No lightheadedness. Compliant with medications. Swelling was intolerable with amlodipine 10, which was reduced to 5. * No diuresis to torsemide 2.5.   Previous history (20May24): eGFR 38 (avg. of CKD-EPIcr & CKD-EPIcys). 207 mg albuminuria. * Renal lesion being followed by urology and IR. * BP controlled. BP 120s at home. No SOB with exertion. No CP. No lightheadedness. Compliant with medications. * Right sided discomfort on 4/8 and CT scan showed possible passed kidney stone on right that was in the bladder. Following up with urologist.   Previous history (09Mar24): BP controlled. BP 120s on average at home. White coat effect in office.No SOB with exertion. No CP. No lightheadedness. Compliant with medications.  * 400 mg proteinuria. eGFR 38 (avg. of CKD-EPIcr & CKD-EPIcys). * Right sided discomfort on 4/8 and CT scan showed possible passed kidney stone on right that was in the bladder. Following up with urologist.   Previous history (11Apr24): Following up with urologist. for stone. * Cr 1.77. * No flank pain now c/w kidney stone. * HTN controlled.   Previous history (09Apr24): Labs from 4/8/24 reviewed: creatinine1.77. BP controlled.  No SOB with exertion. No CP. No lightheadedness. Compliant with medications. * Right sided discomfort on 4/8 and CT scan showed possible passed kidney stone on right that was in the bladder.   Previous history (06Mar24): * CKD stable, creatinine 1.86. Feels well. * He is taking torsemide 0.5 3x a week. * S/P ablation of left renal mass. Being followed yearly. * BP controlled.  - 120s  at home.   Previous history (26Feb24): * BP controlled.  - 120s  at home. White coat effect in office. No lightheadedness. No CP/SOB. Compliant with medications.  * He is taking torsemide 0.5 3x a week. * S/P ablation of left renal mass. Being followed yearly. * 160 mg albuminuria. CKD stable, creatinine 1.86.   Previous history (08Dec23): Labs from Dr. Ye: Cr 1.86. 160 mg albuminuria. * BP controlled.  - 130s at home. White coat effect. No lightheadedness. No CP/SOB. Compliant with medications. * S/P ablation of left renal mass. Being followed yearly. * No further episodes of low temperatures. Baseline is 95 - 96. No chills. * He is taking torsemide 0.5 5x a week.   Previous history (18Sep23): eGFR 37 (CKD-EPIcr & CKD-EPIcys avg). Urine albumin 58. * BP controlled.  - 120s at home. No lightheadedness. No CP/SOB. Compliant with medications. * S/P ablation of left renal mass. Being followed yearly. * 5x nocturia. Following up with urology. NO polyuria during the daytime.  * No further episodes of low temperatures. Baseline is 95 - 96. No chills.   Previous history ([id number]): * Went to ER for low temp of unclear cause. No signs of sepsis. Now normalized. * eGFR 34. 58 * HGA1c controlled. * S/P ablation of left renal mass. Being followed yearly. * 5x nocturia. Following up with urology. NO polyuria during the daytime.   Previous history (12May23): eGFR 41 based on cystatin C of 1.56 (EK eGFRcys equation, [facility] 2023). * HTN controlled. BP 110s at home. No lightheadedness. No CP/SOB. Compliant with medications.  * S/P ablation of left renal mass. 1.1 cm renal mass in right kidney being followed.   Previous history (11Apr23): eGFR 39 based on cystatin C of 1.65 (EK eGFRcys equation, [facility] 2023).* HTN controlled. BP 110s at home. No lightheadedness. No CP/SOB. Compliant with medications. * S/P ablation of 2.6 cm left renal mass. He is following up with radiologist fo surveillance.   Previous history (11Jan23): * CKD stable, creatinine 1.83 in October 2022. * HTN controlled. BP 110s at home. No lightheadedness. No CP/SOB. Compliant with medications. * S/P ablation of 2.6 cm left renal mass. He is following up with radiologist fo surveillance.   Previous history (09Nov22): * CKD stable, creatinine 1.83 on 10/22 with Dr. Ye. ** HTN controlled. BP 110s at home. No lightheadedness. No CP/SOB. Compliant with medications. * S/P ablation of 2.6 cm left renal mass. He is following up with radiologist fo surveillance.   Previous history (18Aug22): * CKD stable, eGFR 32.5. * HTN controlled.  - 130s at home. No lightheadedness. No CP/SOB. Compliant with medications. * He is following up with radiologist for renal cell cancer surveillance. (S/P ablation of 2.6 cm left kidney mass).   Previous history (02Aug22): * HTN controlled.  - 120s at home. No lightheadedness. No CP/SOB. Compliant with medications. * CKD stable, creatinine 1.86. * He is following up with radiologist for renal cell cancer surveillance. (S/P ablation of 2.6 cm left kidney mass).  Previous history (01Jun22): * Covid 1 month ago treated with renal-dose Paxlovid. No symptoms currently. * * HTN controlled.  - 120s at home. No lightheadedness. No CP/SOB. Compliant with medications. * CKD stable, creatinine 1.86.  * He is following up with radiologist for renal cell cancer surveillance. (S/P ablation of 2.6 cm left kidney mass).  Previous history (18Mar22): * HTN controlled.  - 120s at home. No lightheadedness. Compliant with medications. * He is following up with radiologist for renal cell cancer surveillance. (S/P ablation of 2.6 cm left kidney mass). * CKD stable, creatinine 2.02.   Previous history ([id number]): He is following up with radiologist for renal cell cancer surveillance. (S/P ablation of 2.6 cm left kidney mass).  He  * HTN controlled. No lightheadedness.  - 120s at home. Compliant with medications. * Labs from Dr. Ye reviewed. 11/15: Creatinine 2.05.   Previous history (43Cco13): * CKD stable, creatinine 1.94 on 8/31. *  HTN controlled.  - 120s at home. No lightheadedness. Compliant with medications. * He is following up with radiologist for renal cell cancer surveillance. (S/P ablation of 2.6 cm left kidney mass).  Previous history ([id number]): * CKD stable, creatinine 2.18. * * HTN controlled. No lightheadedness. Compliant with medications.  * He is following up with radiologist for renal cell cancer surveillance. (S/P ablation of 2.6 cm left kidney mass).  Previous history (70Xhf85): * HTN controlled.  - 120s at home. No lightheadedness. Compliant with medications. * CKD stable.  * He is following up with radiologist for renal cell cancer surveillance. (S/P ablation of 2.6 cm left kidney mass).  Previous history (09Jun21): * HTN uncontrolled.  - 150s at home. No lightheadedness. Compliant with medications. * Stopped farxiga due to arm erythema, which has now resolved. * He is following up with radiologist for renal cell cancer surveillance. (S/P ablation of 2.6 cm left kidney mass). * CKD stable, creatinine 1.91.   Previous history (17May21): * CKD stable, creatinine 1.9. * He is following up with radiologist for renal cell cancer surveillance. (S/P ablation of 2.6 cm left kidney mass). * HTN uncontrolled at home, 120s - 150s. Unable to tolerate clonidine.  * Started farxiga once and developed bilateral arm redness without other pain. This has now resolving and he is considering starting again when it completely resolves.   Previous history (06May21): * CKD stable, creatinine 1.9 (eGFR 32). 109 mg albuminuria. * He is following up with radiologist for renal cell cancer surveillance. (S/P ablation of 2.6 cm left kidney mass). * HTN uncontrolled at home, 120s - 150s. Unable to tolerate clonidine.   Previous history (29Mar21): * CKD stable, creatinine 1.9.  * He is following up with radiologist for renal cell cancer surveillance. (S/P ablation of 2.6 cm left kidney mass). * HTN previously borderline controlled.  with Dr. Huang. No lightheadedness. BP 110s - 130 at home.   Previous history (20Jan21): * Moderna vaccine given yesterday. * CKD stable, creatinine 1.94 (eGFR 31). * He is following up with radiologist for renal cell cancer surveillance. (S/P ablation of 2.6 cm left kidney mass).  * HTN controlled. No lightheadedness.   Previous history (28Dec20): * CKD progressive, creatinine 1.94 (eGFR 31). * He is following up with radiologist for renal cell cancer surveillance. (S/P ablation of 2.6 cm left kidney mass). * 200 - 300 mg proteinuria. * Losartan decreased to 50 and amlodipine increased to 10 by Dr. Huang [id number]. BP 110s/70s. No lightheadedness.  * Diplopia due to NPH improving, S/P  shunt. * Anemia improved.   Previous history (21Oct20): * Diplopia due to NPH improving, S/P  shunt. * CKD progressive, creatinine 1.9 eGFR 34. *  PTH 75, Ca 9.3. * HTN controlled, 110 - 120s/70s. No lightheadedness. * He is following up with radiologist for renal cell cancer surveillance. (S/P ablation of 2.6 cm left kidney mass).  Previous history (17Aug20): * 75 ounces of urine daily.  617 osm urine. Urine decreases when he drinks less. * CKD stable, creatinine 1.52, eGFR 33 by cystatin C. * HTN now controlled, average  / 70s. Still on amlodipine 5 mg qd. Now off dexamethasone.   Previous history (07Aug20): *  shunt placed for NPH. Now has double vision. Started on dexamethasone 5 days ago. * Creatinine increased to 1.86 (eGFR 33) on [id number]. U/A normal. * He is following up with radiologist for renal cell cancer surveillance. (S/P ablation of 2.6 cm left kidney mass). * SBP now 140s. He had previously been on amlodipine 7.5 and is now on amlodipine 5. BP at home 120s. * HE WAKES UP 4 - 6 TIMES A NIGHT AND URINATES 2 LITERS. He is following with Cristian Sarmiento (urologist). PVR 82. SG 1.014. SNa 142. Reportedly he urinates < 4 times daily. Denies increased thirst.   Previous history (26May20): * CKD stable, creatinine previously 1.49. He has not had labs in 5 months. * Diagnosed with PD. Following up with Dr. Nino. * MRI scheduled for renal cell cancer surveillance. *   Previous history (13Apr20): * Average BP at home 120s - 130s. No lightheadedness. Compliant. * CKD stable, creatinine 1.46. * No muscle aching or weakness on statin. * He is following up with radiologist for renal cell cancer surveillance.   Previous history (01Oct19): * 1 - 2 weeks ago he developed URI with slight diarrhea, which is now resolving. * CKD previously stable, creatinine 1.88. * HTN controlled. No lightheadedness. Compliant with medications.   Previous history (17Sep19): * Creatinine increased to 1.88 in [id number] and decreased to 1.66. * HTN borderline controlled. No lightheadedness. Compliant with medications. * He has been evaluated by Dr. Meek for monoclonal gammopathy.   Previous history (16Aug19): * Following up with Dr. Kwan s/p ablation of left renal cell carcinoma. * CKD previous stable, creatinine 1.59 in 6/27/19.. * HTN controlled. No lightheadedness. Compliant with medications. * he has been evaluated by Dr. Meek for monoclonal gammopathy.  Previous history (16May19): * Underwent ablation of left renal cell carcinoma. * CKD stable, creatinine 1.52. * HTN controlled.  - 130s at home.  No lightheadedness. Compliant with medications.    Previous history (03Apr19): * Scheduled for ablation for left renal cell carcinoma (2.6 cm). * CKD stable, creatinine 1.42. * HTN uncontrolled in office, SBP 120s - 130s average at home.  SBP with Dr. Huang.  No lightheadedness. Compliant with medications. Following low salt diet. * He believes amlodipine causing constipation and LE edema and would like to stop this.   Previous history (14Feb19): * Since 2014, his creatinine has ranged from 1.4 - 1.7. Most recently, his creatinine was 1.48 (GFR 44). He is unaware of proteinuria or hematuria. The patient denies exposure to chronic NSAIDs, PPIs, green smoothies, creatine, or herbal supplements. The patient denies a history of kidney stones or UTIs.  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 No evidence of myeloma.    Previous chart reviewed and summarized.   Options for clinical preventative services  Moderna 20Jan21, Booster;  Bivalent 2022  Influenza: September 2020, sep 2022  Pneumonia: given x 2 Shingles: shingrix  TDAP: Colonoscopy: 2018 PSA: by urology Dermatologist: advised to see yearly

## 2025-07-17 NOTE — ASSESSMENT
[FreeTextEntry1] : -- # HTN controlled. White coat effect. * Continue torsemide 2.5 mg three times a week. (This is the dose he has been taking.) * Cont torsemide, losartan, amlodipine. ** The patient's blood pressure was checked using the SPRINT trial protocol (with the [employer] [id number] or Manning Theresa 73CT) after sitting quietly in an empty room with arm supported, back supported, and feet on the floor for 5 minutes. The average of 3 readings were taken. * A counseling information sheet on blood pressure and staying healthy has been given (which they have been instructed to read). * The patient has been counseled to check their BP at home with an automatic arm cuff, write down the readings, and reach me directly on the phone immediately if they are persistently > 180 systolic or if SBP is less than 100 or if lightheadedness develops. They were counseled to bring in all blood pressure readings and medications next visit. * The patient has been counseled that regular office follow-up (at least every 2 months for now) is important for monitoring and for their health, and that it is their responsibility to make follow up appointments. * The patient also has been counseled that they must never stop or change any medications without discussing this with me (or another physician).   # CKD stage 3 due to aging/HTN with 124 mg albuminuria. * Recheck labs next visit. * Not on SGLT2i due to rash. * Therapies for kidney disease: blood pressure control; proteinuria reduction with ARB/ACEi; other evidence-based therapies recommended including exercise, a plant-based lower oxalate diet, and 400 mcg folic acid daily * Cardiovascular disease prevention: counseling on healthy diet, physical activity, weight loss, alcohol limitation, blood pressure control; statin therapy; cardiology evaluation/followup advised * A counseling information sheet on CKD has been given (which they have been instructed to read). * The patient has been counseled that chronic kidney disease is a significant condition and regular office follow-up with me (at least every 2 months for now) is important for monitoring and their health, and that it is their responsibility to make a follow-up appointment. * The patient has been counseled never to stop taking their medications without discussing it with me or another doctor. * The patient has been counseled on avoiding NSAIDs. * The patient has been counseled on risk of worsening kidney function and instructed to immediately call and speak with me and go immediately to ER with any severe symptoms, nausea, vomiting, diarrhea, chest pain, or shortness of breath.  # Less than 3 cm kidney cancer. * Follow up with radiology/urology.  # Hypokalemia. * Improved.  # Urolithiasis. * High fluid intake.  # Monoclonal protein. * Advised to follow up with heme.  # Hyperpara. * Follow up PTH.